# Patient Record
Sex: MALE | Race: BLACK OR AFRICAN AMERICAN | Employment: FULL TIME | URBAN - METROPOLITAN AREA
[De-identification: names, ages, dates, MRNs, and addresses within clinical notes are randomized per-mention and may not be internally consistent; named-entity substitution may affect disease eponyms.]

---

## 2017-11-13 ENCOUNTER — HOSPITAL ENCOUNTER (OUTPATIENT)
Dept: MRI IMAGING | Age: 37
Discharge: HOME | End: 2017-11-13
Attending: NURSE PRACTITIONER
Payer: COMMERCIAL

## 2017-11-13 DIAGNOSIS — M54.16 LUMBAR RADICULOPATHY: ICD-10-CM

## 2017-11-13 PROCEDURE — 72148 MRI LUMBAR SPINE W/O DYE: CPT

## 2019-08-06 ENCOUNTER — OFFICE VISIT (OUTPATIENT)
Dept: NEUROLOGY | Age: 39
End: 2019-08-06

## 2019-08-06 VITALS
HEIGHT: 69 IN | SYSTOLIC BLOOD PRESSURE: 132 MMHG | WEIGHT: 194 LBS | OXYGEN SATURATION: 99 % | DIASTOLIC BLOOD PRESSURE: 82 MMHG | HEART RATE: 64 BPM | BODY MASS INDEX: 28.73 KG/M2

## 2019-08-06 DIAGNOSIS — R53.83 OTHER FATIGUE: ICD-10-CM

## 2019-08-06 DIAGNOSIS — M54.41 ACUTE MIDLINE LOW BACK PAIN WITH RIGHT-SIDED SCIATICA: Primary | ICD-10-CM

## 2019-08-06 DIAGNOSIS — M54.2 NECK PAIN: ICD-10-CM

## 2019-08-06 RX ORDER — TIZANIDINE 4 MG/1
4 TABLET ORAL
Qty: 30 TAB | Refills: 5 | Status: SHIPPED | OUTPATIENT
Start: 2019-08-06 | End: 2022-11-01

## 2019-08-06 NOTE — PATIENT INSTRUCTIONS
A Healthy Lifestyle: Care Instructions  Your Care Instructions    A healthy lifestyle can help you feel good, stay at a healthy weight, and have plenty of energy for both work and play. A healthy lifestyle is something you can share with your whole family. A healthy lifestyle also can lower your risk for serious health problems, such as high blood pressure, heart disease, and diabetes. You can follow a few steps listed below to improve your health and the health of your family. Follow-up care is a key part of your treatment and safety. Be sure to make and go to all appointments, and call your doctor if you are having problems. It's also a good idea to know your test results and keep a list of the medicines you take. How can you care for yourself at home? · Do not eat too much sugar, fat, or fast foods. You can still have dessert and treats now and then. The goal is moderation. · Start small to improve your eating habits. Pay attention to portion sizes, drink less juice and soda pop, and eat more fruits and vegetables. ? Eat a healthy amount of food. A 3-ounce serving of meat, for example, is about the size of a deck of cards. Fill the rest of your plate with vegetables and whole grains. ? Limit the amount of soda and sports drinks you have every day. Drink more water when you are thirsty. ? Eat at least 5 servings of fruits and vegetables every day. It may seem like a lot, but it is not hard to reach this goal. A serving or helping is 1 piece of fruit, 1 cup of vegetables, or 2 cups of leafy, raw vegetables. Have an apple or some carrot sticks as an afternoon snack instead of a candy bar. Try to have fruits and/or vegetables at every meal.  · Make exercise part of your daily routine. You may want to start with simple activities, such as walking, bicycling, or slow swimming. Try to be active 30 to 60 minutes every day. You do not need to do all 30 to 60 minutes all at once.  For example, you can exercise 3 times a day for 10 or 20 minutes. Moderate exercise is safe for most people, but it is always a good idea to talk to your doctor before starting an exercise program.  · Keep moving. Yanira Koenig the lawn, work in the garden, or [employer]. Take the stairs instead of the elevator at work. · If you smoke, quit. People who smoke have an increased risk for heart attack, stroke, cancer, and other lung illnesses. Quitting is hard, but there are ways to boost your chance of quitting tobacco for good. ? Use nicotine gum, patches, or lozenges. ? Ask your doctor about stop-smoking programs and medicines. ? Keep trying. In addition to reducing your risk of diseases in the future, you will notice some benefits soon after you stop using tobacco. If you have shortness of breath or asthma symptoms, they will likely get better within a few weeks after you quit. · Limit how much alcohol you drink. Moderate amounts of alcohol (up to 2 drinks a day for men, 1 drink a day for women) are okay. But drinking too much can lead to liver problems, high blood pressure, and other health problems. Family health  If you have a family, there are many things you can do together to improve your health. · Eat meals together as a family as often as possible. · Eat healthy foods. This includes fruits, vegetables, lean meats and dairy, and whole grains. · Include your family in your fitness plan. Most people think of activities such as jogging or tennis as the way to fitness, but there are many ways you and your family can be more active. Anything that makes you breathe hard and gets your heart pumping is exercise. Here are some tips:  ? Walk to do errands or to take your child to school or the bus.  ? Go for a family bike ride after dinner instead of watching TV. Where can you learn more? Go to http://columba-jamel.info/. Enter X109 in the search box to learn more about \"A Healthy Lifestyle: Care Instructions. \"  Current as of: September 11, 2018  Content Version: 12.1  © 8311-4323 [employer], [employer]. Care instructions adapted under license by [employer] (which disclaims liability or warranty for this information). If you have questions about a medical condition or this instruction, always ask your healthcare professional. [address] any warranty or liability for your use of this information.

## 2019-08-06 NOTE — PROGRESS NOTES
NEUROLOGY HISTORY AND PHYSICAL    Name Willie Bateman Sr. Age 42 y.o. MRN [id number]  1980     Referring Physician:  Self referred     Chief Complaint:  Back pain     This is a 42 y.o.  right handed male with a medical history of anxiety. He comes with a complaint of pain in the lower back close to the tailbone. He was seen by the orthopedic 2 years ago who ordered an MRI of the lower spine and was ordered to do physical therapy which didn't help. He works in [employer]. Assessment and Plan  1. Back pain   Mri reviewed and discussed   tizaindine take as needed at night     2. Neck pain  Discussed pt and massage and using ice    3. fatigue  Consider sleep apnea  Will follow up if he needs to        Patient Allergies  Patient has no known allergies. Past Medical History:   Diagnosis Date    Anxiety disorder     Chest pain     Depression     Fatigue     Frequent headaches     Muscle pain     Muscle weakness     Snoring        Social History     Tobacco Use    Smoking status: Never Smoker    Smokeless tobacco: Never Used   Substance Use Topics    Alcohol use: Yes       Family History  Asthma  Grandfather MI      Exam  Visit Vitals  /82   Pulse 64   Ht 5' 9\" (1.753 m)   Wt 194 lb (88 kg)   SpO2 99%   BMI 28.65 kg/m²      General: Well developed, well nourished. Patient in no apparent distress   Head: Normocephalic, atraumatic, anicteric sclera   Neck Normal ROM, No thyromegally   Lungs:  Clear to auscultation bilaterally, No wheezes or rubs   Cardiac: Regular rate and rhythm with no murmurs. Abd: Bowel sounds were audible. No tenderness on palpation   Ext: No pedal edema   Skin: Supple no rash     NeurologicExam:  Mental Status: Alert and oriented to person place and time   Speech: Fluent no aphasia or dysarthria. Cranial Nerves:  II - XII Intact   Motor:  Full and symmetric strength of upper and lower proximal and distal muscles. Normal bulk and tone.     Reflexes: Deep tendon reflexes 2+/4 and symmetric. Sensory:   Symmetric and intact with no perceived deficits modalities involving small or large fibers. Gait:  Gait is balanced and fluid with normal arm swing. Tremor:   No tremor noted. Cerebellar:  Coordination intact. Neurovascular: No carotid bruits. No JVD       Imaging  MRI Results (most recent):  Results from Hospital Encounter encounter on 11/13/17   MRI LUMB SPINE WO CONT    Narrative EXAM:  MRI LUMB SPINE WO CONT  INDICATION:  Lumbar radiculopathy, constant lower lumbar pain and hip pain for 4  to 5 months right greater than left. TECHNIQUE: Sagittal T1, T2, STIR and axial T1 and T2 weighted images of the  lumbar spine were obtained. COMPARISON: None available. FINDINGS:  The distal spinal cord has normal contour and signal.  The lumbar vertebra are in good alignment. The lower two thoracic disks are unremarkable. L1-L2:  No disc bulge or stenosis. L2-L3:  No disc bulge or stenosis. L3-L4:  No disc bulge or stenosis. L4-L5:  No significant disk bulge or stenosis. L5-S1:  No significant disk bulge or stenosis. Impression IMPRESSION: Normal MRI lumbar spine.

## 2019-08-12 ENCOUNTER — TELEPHONE (OUTPATIENT)
Dept: NEUROLOGY | Age: 39
End: 2019-08-12

## 2019-08-12 NOTE — TELEPHONE ENCOUNTER
----- Message from Wong Garcia sent at 8/12/2019 12:44 PM EDT -----  Regarding: Dr. Fantasma Jackson Telephone  Patient would like a call back regarding getting a note for work.  Contact is [phone]

## 2019-08-13 NOTE — TELEPHONE ENCOUNTER
Patient stated it was recommended during his office visit to take some time off of work. Patient has decided to change his hours from 10 hours to 8 hours due to his back pain. Patient stated a letter with that information would help his schedule to change.     Please advise

## 2019-08-14 NOTE — TELEPHONE ENCOUNTER
Called and spoke with patient and advised the letter is ready,patient stated he will call tomorrow and provide a fax number.

## 2019-08-15 ENCOUNTER — TELEPHONE (OUTPATIENT)
Dept: NEUROLOGY | Age: 39
End: 2019-08-15

## 2019-08-15 NOTE — TELEPHONE ENCOUNTER
----- Message from Bakari Aviles sent at 8/15/2019  3:26 PM EDT -----  Regarding: Dr Lucia He first and last name:      Reason for call:status of fax      Callback required yes/no and why:      Best contact number(s):[phone]      Details to clarify the request: checking on status of fax sent around 11 am today 8/15/19      Bakari Aviles

## 2019-09-05 ENCOUNTER — TELEPHONE (OUTPATIENT)
Dept: NEUROLOGY | Age: 39
End: 2019-09-05

## 2019-09-05 NOTE — TELEPHONE ENCOUNTER
Needs an order for physical therapy sent to [facility] in [place].  Fax number [phone]

## 2019-09-06 ENCOUNTER — TELEPHONE (OUTPATIENT)
Dept: NEUROLOGY | Age: 39
End: 2019-09-06

## 2019-09-06 NOTE — TELEPHONE ENCOUNTER
----- Message from Sherryle Kinds sent at 9/6/2019  9:05 AM EDT -----  Regarding: Dr. Joaquin Alfaro first and last name: Vitor Metzger, .      Reason for call: Pt is advising nurse Marietta Cranker that physical therapy was recommended by Dr. Radha Keith for his condition.       Callback required yes/no and why:  Yes      Best contact number(s): [phone]      Details to clarify the request:      Sherryle Kinds

## 2019-09-09 ENCOUNTER — TELEPHONE (OUTPATIENT)
Dept: NEUROLOGY | Age: 39
End: 2019-09-09

## 2019-09-09 NOTE — TELEPHONE ENCOUNTER
----- Message from Nadira Gurrola sent at 9/9/2019 12:59 PM EDT -----  Regarding: Dr. Jesús Blount  Patient return call    Caller's first and last name and relationship (if not the patient):      Best contact number(s):   [phone]      Whose call is being returned:   Nurse's (name unknown) call received last Friday      Details to clarify the request:   Pt stated, he left a previous message requesting an order be sent to [facility] [phone]. Pt stated, he is currently undergoing physical therapy due back pain.       Nadira Gurrola

## 2019-09-18 NOTE — TELEPHONE ENCOUNTER
Spoke with patient, he knows that Dr. Harish Christopher is out and the request would have to wait for his return.

## 2019-09-24 ENCOUNTER — DOCUMENTATION ONLY (OUTPATIENT)
Dept: NEUROLOGY | Age: 39
End: 2019-09-24

## 2019-09-24 ENCOUNTER — TELEPHONE (OUTPATIENT)
Dept: NEUROLOGY | Age: 39
End: 2019-09-24

## 2019-09-24 NOTE — TELEPHONE ENCOUNTER
----- Message from Fred Pina sent at 9/24/2019 12:22 PM EDT -----  Regarding: Dr. Ana Choi first and last name: Rox Owenvidal      Reason for call: Pt following up on a faxed health care provider form to be sent pt's employer.       Callback required yes/no and why: yes      Best contact number(s): [phone]      Details to clarify the request:      Fred Pina

## 2019-09-24 NOTE — PROGRESS NOTES
Faxed work accommodation information to [employer], as well as mailed the patient a copy of what has been completed.

## 2019-09-24 NOTE — TELEPHONE ENCOUNTER
Spoke with the patient, advised the work accomodation paperwork has been faxed and a copy has been placed in the mail for him as well.

## 2022-11-01 ENCOUNTER — OFFICE VISIT (OUTPATIENT)
Dept: FAMILY MEDICINE CLINIC | Age: 42
End: 2022-11-01
Payer: COMMERCIAL

## 2022-11-01 VITALS
DIASTOLIC BLOOD PRESSURE: 82 MMHG | BODY MASS INDEX: 31.87 KG/M2 | SYSTOLIC BLOOD PRESSURE: 133 MMHG | RESPIRATION RATE: 16 BRPM | OXYGEN SATURATION: 94 % | TEMPERATURE: 97.9 F | WEIGHT: 215.2 LBS | HEIGHT: 69 IN | HEART RATE: 64 BPM

## 2022-11-01 DIAGNOSIS — Z11.59 PCR POSITIVE FOR HERPES SIMPLEX VIRUS TYPE 1 (HSV-1) DNA: ICD-10-CM

## 2022-11-01 DIAGNOSIS — R10.2 PERINEUM PAIN, MALE: ICD-10-CM

## 2022-11-01 DIAGNOSIS — R19.7 INTERMITTENT DIARRHEA: Primary | ICD-10-CM

## 2022-11-01 DIAGNOSIS — B00.9 PCR POSITIVE FOR HERPES SIMPLEX VIRUS TYPE 1 (HSV-1) DNA: ICD-10-CM

## 2022-11-01 DIAGNOSIS — Z13.220 LIPID SCREENING: ICD-10-CM

## 2022-11-01 PROCEDURE — 99204 OFFICE O/P NEW MOD 45 MIN: CPT | Performed by: STUDENT IN AN ORGANIZED HEALTH CARE EDUCATION/TRAINING PROGRAM

## 2022-11-01 NOTE — PROGRESS NOTES
Assessment/Plan:     Diagnoses and all orders for this visit:    1. Intermittent diarrhea  -     REFERRAL TO GASTROENTEROLOGY  -     CBC WITH AUTOMATED DIFF; Future  -     METABOLIC PANEL, COMPREHENSIVE; Future  -     SED RATE (ESR); Future  -     CRP, HIGH SENSITIVITY; Future  -     TSH 3RD GENERATION; Future  -Chronic intermittent diarrhea occurring for 2 to 3 days at a time and then will recur monthly  -Will check routine lab work for chronic diarrhea. -Given this has been persistent for years recommend GI referral for possible colonoscopy  -Patient can try loperamide as needed for acute episodes of diarrhea  -Return precautions discussed    2. Perineum pain, male  -Normal exam  -Symptoms only occur when having diarrhea therefore suspect it could be skin irritation due to his bowel movements  -Plan for treating diarrhea and find underlying etiology. See plan above    3. 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  -No current outbreak  -Return precautions discussed and can treat on an as-needed basis    4. Lipid screening  -     LIPID PANEL; Future      Follow-up and Dispositions    Return if symptoms worsen or fail to improve. Discussed expected course/resolution/complications of diagnosis in detail with patient. Medication risks/benefits/costs/interactions/alternatives discussed with patient. Pt expressed understanding with the diagnosis and plan    Subjective:      Joe Hill is a 38 y.o. male who presents for had concerns including Establish Care and Exposure to STD.        No Known Allergies  Past Medical History:   Diagnosis Date    Anxiety disorder     Chest pain     Depression     Fatigue     Frequent headaches     Muscle pain     Muscle weakness     Snoring      Past Surgical History:   Procedure Laterality Date    HX WISDOM TEETH EXTRACTION       Family History   Problem Relation Age of Onset    Asthma Mother     Asthma Brother     Asthma Maternal Grandmother     Heart Disease Maternal Grandfather      Social History     Socioeconomic History    Marital status: SINGLE     Spouse name: Not on file    Number of children: Not on file    Years of education: Not on file    Highest education level: Not on file   Occupational History    Not on file   Tobacco Use    Smoking status: Never    Smokeless tobacco: Never   Vaping Use    Vaping Use: Never used   Substance and Sexual Activity    Alcohol use: Yes     Comment: occ    Drug use: Never    Sexual activity: Yes   Other Topics Concern    Not on file   Social History Narrative    Not on file     Social Determinants of Health     Financial Resource Strain: Not on file   Food Insecurity: Not on file   Transportation Needs: Not on file   Physical Activity: Not on file   Stress: Not on file   Social Connections: Not on file   Intimate Partner Violence: Not on file   Housing Stability: Not on file       Patient is a 22-year-old male who presents the office today to establish care and for concerns of recent positive HSV-1 noted on lab work. Patient states he had recent lab work STD screening completed and was negative for hep A, B, C, HIV, syphilis, gonorrhea and chlamydia and HSV 2 however he was noted to be positive for IgG HSV-1. Patient has concerns of how he may have gotten this infection. He denies any active cold sores or genital lesion outbreak. Patient also states that he has been having intermittent episodes of diarrhea for several years now. He notes that about once per month he will get several days of persistent diarrhea. This will also cause a burning sensation in the perineum location. He has noted some bright red blood on the toilet paper in the past but none in the stool. He states he has currently had about a 2 to 3-day episode of diarrhea.   He states that his stools will normalize however this will recur. He denies any known food triggers. He denies any significant abdominal pain except some mild cramping when he is having a bowel movement. ROS:   Review of Systems   Constitutional:  Negative for chills and fever. HENT:  Negative for congestion. Respiratory:  Negative for cough and shortness of breath. Cardiovascular:  Negative for chest pain and leg swelling. Gastrointestinal:  Positive for diarrhea. Negative for abdominal pain, blood in stool, melena, nausea and vomiting. Genitourinary:  Negative for dysuria, frequency, hematuria and urgency. Neurological:  Negative for dizziness, tingling, weakness and headaches. Objective:   Visit Vitals  /82   Pulse 64   Temp 97.9 °F (36.6 °C) (Temporal)   Resp 16   Ht 5' 9\" (1.753 m)   Wt 215 lb 3.2 oz (97.6 kg)   SpO2 94%   BMI 31.78 kg/m²         Vitals and Nurse Documentation reviewed. Physical Exam  Exam conducted with a chaperone present. Constitutional:       General: He is not in acute distress. Appearance: Normal appearance. He is not toxic-appearing. HENT:      Head: Normocephalic and atraumatic. Eyes:      Conjunctiva/sclera: Conjunctivae normal.   Cardiovascular:      Rate and Rhythm: Normal rate and regular rhythm. Pulses: Normal pulses. Heart sounds: Normal heart sounds. No murmur heard. No gallop. Pulmonary:      Effort: Pulmonary effort is normal. No respiratory distress. Breath sounds: Normal breath sounds. No wheezing or rhonchi. Abdominal:      General: Bowel sounds are normal. There is no distension. Palpations: Abdomen is soft. Tenderness: There is no abdominal tenderness. There is no guarding or rebound. Genitourinary:     Penis: Normal. No erythema or lesions. Testes: Normal.      Rectum: No anal fissure or external hemorrhoid. Musculoskeletal:      Right lower leg: No edema. Left lower leg: No edema.    Neurological:      Mental Status: He is alert and oriented to person, place, and time.       Gait: Gait normal.

## 2022-11-01 NOTE — PROGRESS NOTES
Garret Elaine is a 38 y.o. male      Chief Complaint   Patient presents with    Establish Care    Exposure to STD         1. Have you been to the ER, urgent care clinic since your last visit? No Hospitalized since your last visit? 2. Have you seen or consulted any other health care providers outside of the [address] [place] [address] since your last visit? Include any pap smears or colon screening.    No

## 2023-01-27 ENCOUNTER — VIRTUAL VISIT (OUTPATIENT)
Dept: FAMILY MEDICINE CLINIC | Age: 43
End: 2023-01-27
Payer: COMMERCIAL

## 2023-01-27 ENCOUNTER — PATIENT MESSAGE (OUTPATIENT)
Dept: FAMILY MEDICINE CLINIC | Age: 43
End: 2023-01-27

## 2023-01-27 DIAGNOSIS — J06.9 VIRAL UPPER RESPIRATORY ILLNESS: Primary | ICD-10-CM

## 2023-01-27 PROCEDURE — 99213 OFFICE O/P EST LOW 20 MIN: CPT | Performed by: FAMILY MEDICINE

## 2023-01-27 NOTE — PROGRESS NOTES
Janna Manzanares is a 38 y.o. male who was seen by synchronous (real-time) audio-video technology on 1/27/2023 for Flu, Cough, and Sore Throat (/)    He has sore throat since Monday and got very bad 2 days ago but is feeling better today  Has a cough. No fever or chills  He works at [employer]. Decided he should get tested today to see if he can get meds  I explained he is out of the window for covid and influenza medication and since he is feeling better is likely not needed      Assessment & Plan:   Diagnoses and all orders for this visit:    1.  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 11/01/2022       ROS    Objective:     Patient-Reported Vitals 1/27/2023   Patient-Reported Weight 212lb        [INSTRUCTIONS:  \"[x]\" Indicates a positive item  \"[]\" Indicates a negative item  -- DELETE ALL ITEMS NOT EXAMINED]    Constitutional: [x] Appears well-developed and well-nourished [x] No apparent distress      [] Abnormal -     Mental status: [x] Alert and awake  [x] Oriented to person/place/time [x] Able to follow commands    [] Abnormal -     Eyes:   EOM    [x]  Normal    [] Abnormal -   Sclera  [x]  Normal    [] Abnormal -          Discharge [x]  None visible   [] Abnormal -     HENT: [x] Normocephalic, atraumatic  [] Abnormal -   [x] Mouth/Throat: Mucous membranes are moist    External Ears [x] Normal  [] Abnormal -    Neck: [x] No visualized mass [] Abnormal -     Pulmonary/Chest: [x] Respiratory effort normal   [x] No visualized signs of difficulty breathing or respiratory distress        [] Abnormal -      Musculoskeletal:   [x] Normal gait with no signs of ataxia         [x] Normal range of motion of neck        [] Abnormal -     Neurological:        [x] No Facial Asymmetry (Cranial nerve 7 motor function) (limited exam due to video visit)          [x] No gaze palsy        [] Abnormal -          Skin:        [x] No significant exanthematous lesions or discoloration noted on facial skin         [] Abnormal -            Psychiatric:       [x] Normal Affect [] Abnormal -        [x] No Hallucinations    Other pertinent observable physical exam findings:-        We discussed the expected course, resolution and complications of the diagnosis(es) in detail. Medication risks, benefits, costs, interactions, and alternatives were discussed as indicated. I advised him to contact the office if his condition worsens, changes or fails to improve as anticipated. He expressed understanding with the diagnosis(es) and plan. Tino BowerTanika, was evaluated through a synchronous (real-time) audio-video encounter. The patient (or guardian if applicable) is aware that this is a billable service, which includes applicable co-pays. This Virtual Visit was conducted with patient's (and/or legal guardian's) consent. The visit was conducted pursuant to the emergency declaration under the [address] [place] [address] authority and the [employer] General Act. Patient identification was verified, and a caregiver was present when appropriate.   The patient was located at: Home: [address]  The provider was located at: Home: Marielena Hernandez MD

## 2023-01-27 NOTE — LETTER
NOTIFICATION RETURN TO WORK / SCHOOL    1/27/2023 11:48 AM    Mr. Sherice Askew [phone]      To Whom It May Concern:    Gina Rivero. is currently under the care of [address]. He will return to work/school on: Tuesday January 31, 2023    If there are questions or concerns please have the patient contact our office.         Sincerely,      Erma Vogel MD

## 2023-01-27 NOTE — PROGRESS NOTES
1. Have you been to the ER, urgent care clinic since your last visit? Hospitalized since your last visit? No    2. Have you seen or consulted any other health care providers outside of the [address] [place] [address] since your last visit? Include any pap smears or colon screening. No    Chief Complaint   Patient presents with    Flu    Cough    Sore Throat            Patient-Reported Vitals 1/27/2023   Patient-Reported Weight 212lb      3 most recent PHQ Screens 1/27/2023   Little interest or pleasure in doing things Not at all   Feeling down, depressed, irritable, or hopeless Not at all   Total Score PHQ 2 0     Abuse Screening Questionnaire 1/27/2023   Do you ever feel afraid of your partner? N   Are you in a relationship with someone who physically or mentally threatens you? N   Is it safe for you to go home?  Y     Learning Assessment 8/6/2019   PRIMARY LEARNER Patient   PRIMARY LANGUAGE ENGLISH   LEARNER PREFERENCE PRIMARY DEMONSTRATION   ANSWERED BY patient   RELATIONSHIP SELF     Health Maintenance Due   Topic Date Due    Hepatitis C Screening  Never done    COVID-19 Vaccine (1) Never done    DTaP/Tdap/Td series (1 - Tdap) Never done    Flu Vaccine (1) Never done

## 2024-06-06 ENCOUNTER — HOSPITAL ENCOUNTER (EMERGENCY)
Facility: HOSPITAL | Age: 44
Discharge: HOME | End: 2024-06-06
Attending: STUDENT IN AN ORGANIZED HEALTH CARE EDUCATION/TRAINING PROGRAM
Payer: COMMERCIAL

## 2024-06-06 ENCOUNTER — APPOINTMENT (OUTPATIENT)
Facility: HOSPITAL | Age: 44
End: 2024-06-06
Payer: COMMERCIAL

## 2024-06-06 VITALS
HEIGHT: 69 IN | RESPIRATION RATE: 20 BRPM | TEMPERATURE: 98.4 F | WEIGHT: 210 LBS | BODY MASS INDEX: 31.1 KG/M2 | HEART RATE: 85 BPM | SYSTOLIC BLOOD PRESSURE: 126 MMHG | OXYGEN SATURATION: 96 % | DIASTOLIC BLOOD PRESSURE: 80 MMHG

## 2024-06-06 DIAGNOSIS — R42 DIZZINESS: Primary | ICD-10-CM

## 2024-06-06 DIAGNOSIS — S09.90XA INJURY OF HEAD, INITIAL ENCOUNTER: ICD-10-CM

## 2024-06-06 LAB
ALBUMIN SERPL-MCNC: 4.4 G/DL (ref 3.5–5)
ALBUMIN/GLOB SERPL: 1.2 (ref 1.1–2.2)
ALP SERPL-CCNC: 64 U/L (ref 45–117)
ALT SERPL-CCNC: 37 U/L (ref 12–78)
ANION GAP SERPL CALC-SCNC: 9 MMOL/L (ref 5–15)
AST SERPL-CCNC: 18 U/L (ref 15–37)
BASOPHILS # BLD: 0 K/UL (ref 0–0.1)
BASOPHILS NFR BLD: 0 % (ref 0–1)
BILIRUB SERPL-MCNC: 0.4 MG/DL (ref 0.2–1)
BUN/CREAT SERPL: 10 (ref 12–20)
CALCIUM SERPL-MCNC: 9 MG/DL (ref 8.5–10.1)
CO2 SERPL-SCNC: 31 MMOL/L (ref 21–32)
CREAT SERPL-MCNC: 1.29 MG/DL (ref 0.7–1.3)
DIFFERENTIAL METHOD BLD: ABNORMAL
EOSINOPHIL # BLD: 0.1 K/UL (ref 0–0.4)
EOSINOPHIL NFR BLD: 1 % (ref 0–7)
ERYTHROCYTE [DISTWIDTH] IN BLOOD BY AUTOMATED COUNT: 12.9 % (ref 11.5–14.5)
GLOBULIN SER CALC-MCNC: 3.7 G/DL (ref 2–4)
GLUCOSE SERPL-MCNC: 86 MG/DL (ref 65–100)
HCT VFR BLD AUTO: 42.2 % (ref 36.6–50.3)
HGB BLD-MCNC: 13.9 G/DL (ref 12.1–17)
IMM GRANULOCYTES # BLD AUTO: 0.1 K/UL (ref 0–0.04)
IMM GRANULOCYTES NFR BLD AUTO: 1 % (ref 0–0.5)
LYMPHOCYTES # BLD: 1.7 K/UL (ref 0.8–3.5)
LYMPHOCYTES NFR BLD: 24 % (ref 12–49)
MCH RBC QN AUTO: 26.1 PG (ref 26–34)
MCV RBC AUTO: 79.3 FL (ref 80–99)
MONOCYTES # BLD: 0.6 K/UL (ref 0–1)
MONOCYTES NFR BLD: 8 % (ref 5–13)
NEUTS SEG # BLD: 4.8 K/UL (ref 1.8–8)
NEUTS SEG NFR BLD: 66 % (ref 32–75)
NRBC # BLD: 0 K/UL (ref 0–0.01)
PLATELET # BLD AUTO: 220 K/UL (ref 150–400)
PMV BLD AUTO: 10.7 FL (ref 8.9–12.9)
POTASSIUM SERPL-SCNC: 3.4 MMOL/L (ref 3.5–5.1)
PROT SERPL-MCNC: 8.1 G/DL (ref 6.4–8.2)
RBC # BLD AUTO: 5.32 M/UL (ref 4.1–5.7)
SODIUM SERPL-SCNC: 141 MMOL/L (ref 136–145)
TROPONIN I SERPL HS-MCNC: 4 NG/L (ref 0–76)
WBC # BLD AUTO: 7.3 K/UL (ref 4.1–11.1)

## 2024-06-06 PROCEDURE — 2580000003 HC RX 258: Performed by: STUDENT IN AN ORGANIZED HEALTH CARE EDUCATION/TRAINING PROGRAM

## 2024-06-06 PROCEDURE — 93005 ELECTROCARDIOGRAM TRACING: CPT | Performed by: STUDENT IN AN ORGANIZED HEALTH CARE EDUCATION/TRAINING PROGRAM

## 2024-06-06 PROCEDURE — 96375 TX/PRO/DX INJ NEW DRUG ADDON: CPT

## 2024-06-06 PROCEDURE — 85025 COMPLETE CBC W/AUTO DIFF WBC: CPT

## 2024-06-06 PROCEDURE — 36415 COLL VENOUS BLD VENIPUNCTURE: CPT

## 2024-06-06 PROCEDURE — 70450 CT HEAD/BRAIN W/O DYE: CPT

## 2024-06-06 PROCEDURE — 99285 EMERGENCY DEPT VISIT HI MDM: CPT

## 2024-06-06 PROCEDURE — 84484 ASSAY OF TROPONIN QUANT: CPT

## 2024-06-06 PROCEDURE — 6360000002 HC RX W HCPCS: Performed by: STUDENT IN AN ORGANIZED HEALTH CARE EDUCATION/TRAINING PROGRAM

## 2024-06-06 PROCEDURE — 71046 X-RAY EXAM CHEST 2 VIEWS: CPT

## 2024-06-06 PROCEDURE — 96374 THER/PROPH/DIAG INJ IV PUSH: CPT

## 2024-06-06 PROCEDURE — 80053 COMPREHEN METABOLIC PANEL: CPT

## 2024-06-06 RX ORDER — KETOROLAC TROMETHAMINE 30 MG/ML
15 INJECTION, SOLUTION INTRAMUSCULAR; INTRAVENOUS
Status: COMPLETED | OUTPATIENT
Start: 2024-06-06 | End: 2024-06-06

## 2024-06-06 RX ORDER — 0.9 % SODIUM CHLORIDE 0.9 %
1000 INTRAVENOUS SOLUTION INTRAVENOUS ONCE
Status: COMPLETED | OUTPATIENT
Start: 2024-06-06 | End: 2024-06-06

## 2024-06-06 RX ORDER — ONDANSETRON 2 MG/ML
4 INJECTION INTRAMUSCULAR; INTRAVENOUS ONCE
Status: COMPLETED | OUTPATIENT
Start: 2024-06-06 | End: 2024-06-06

## 2024-06-06 RX ADMIN — ONDANSETRON 4 MG: 2 INJECTION INTRAMUSCULAR; INTRAVENOUS at 16:40

## 2024-06-06 RX ADMIN — SODIUM CHLORIDE 1000 ML: 9 INJECTION, SOLUTION INTRAVENOUS at 16:40

## 2024-06-06 RX ADMIN — KETOROLAC TROMETHAMINE 15 MG: 30 INJECTION, SOLUTION INTRAMUSCULAR at 16:41

## 2024-06-06 ASSESSMENT — LIFESTYLE VARIABLES
HOW MANY STANDARD DRINKS CONTAINING ALCOHOL DO YOU HAVE ON A TYPICAL DAY: PATIENT DOES NOT DRINK
HOW OFTEN DO YOU HAVE A DRINK CONTAINING ALCOHOL: NEVER

## 2024-06-06 ASSESSMENT — PAIN - FUNCTIONAL ASSESSMENT: PAIN_FUNCTIONAL_ASSESSMENT: 0-10

## 2024-06-06 ASSESSMENT — PAIN DESCRIPTION - LOCATION
LOCATION: HEAD
LOCATION: HEAD

## 2024-06-06 ASSESSMENT — PAIN SCALES - GENERAL
PAINLEVEL_OUTOF10: 5
PAINLEVEL_OUTOF10: 5

## 2024-06-06 ASSESSMENT — ENCOUNTER SYMPTOMS
ABDOMINAL PAIN: 0
NAUSEA: 1
SHORTNESS OF BREATH: 0

## 2024-06-06 ASSESSMENT — PAIN DESCRIPTION - DESCRIPTORS: DESCRIPTORS: ACHING

## 2024-06-06 NOTE — ED PROVIDER NOTES
[facility] EMERGENCY DEPT  EMERGENCY DEPARTMENT ENCOUNTER      Pt Name: Tacos Raymond Sr.  MRN: [id number]  Birthdate 1980  Date of evaluation: 6/6/2024  Provider: Andrea Baez DO    CHIEF COMPLAINT       Chief Complaint   Patient presents with    Dizziness         HISTORY OF PRESENT ILLNESS   (Location/Symptom, Timing/Onset, Context/Setting, Quality, Duration, Modifying Factors, Severity)  Note limiting factors.   This is a 44-year-old male who presents ED for evaluation of headache and lightheadedness.  Patient reports that he was trying to get in a car about 3 hours ago struck his head on the anterior aspect on the metal frame.  Did not lose conscious reports having some dizziness and tingling sensation in his fingers and feeling weird.  Had some nausea without vomiting.  He is not on anticoagulation.  Reports that he always has chest discomfort no history of heart attack, no shortness of breath no abdominal pain.            Review of External Medical Records:     Nursing Notes were reviewed.    REVIEW OF SYSTEMS    (2-9 systems for level 4, 10 or more for level 5)     Review of Systems   Respiratory:  Negative for shortness of breath.    Cardiovascular:  Positive for chest pain.   Gastrointestinal:  Positive for nausea. Negative for abdominal pain.   Neurological:  Positive for facial asymmetry and light-headedness.       Except as noted above the remainder of the review of systems was reviewed and negative.       PAST MEDICAL HISTORY     Past Medical History:   Diagnosis Date    Anxiety disorder     Chest pain     Depression     Fatigue     Frequent headaches     Muscle pain     Muscle weakness     Snoring          SURGICAL HISTORY       Past Surgical History:   Procedure Laterality Date    WISDOM TOOTH EXTRACTION           CURRENT MEDICATIONS       Previous Medications    No medications on file       ALLERGIES     Patient has no known allergies.    FAMILY HISTORY       Family History   Problem

## 2024-06-06 NOTE — ED TRIAGE NOTES
Patient ambulatory to room 7 escorted by EMS for concern of dizziness. Patient states he was waiting for a prescription in [facility] when he felt dizzy, bilateral tingling in fingers, right arm \"feeling weird\".   Patient states he has a hx of dizziness. Patient states he bump his forehead on the car door as he was getting into his car about 3 hours prior to the dizzy spell.

## 2024-06-06 NOTE — DISCHARGE INSTRUCTIONS
The patient has been re-evaluated and are stable for discharge.  All available radiology and laboratory results have been reviewed with patient and/or available family.  Patient and/or family verbally conveyed their understanding and agreement of the patient's signs, symptoms, diagnosis, treatment and prognosis and additionally agree to follow-up as recommended in the discharge instructions or to return to the Emergency Department should their condition change or worsen prior to their follow-up appointment.  All questions have been answered and patient and/or available family who express understanding.  Directions

## 2024-06-07 LAB
EKG ATRIAL RATE: 86 BPM
EKG DIAGNOSIS: NORMAL
EKG P AXIS: 48 DEGREES
EKG P-R INTERVAL: 178 MS
EKG QRS DURATION: 88 MS
EKG QTC CALCULATION (BAZETT): 423 MS
EKG R AXIS: 76 DEGREES
EKG T AXIS: 29 DEGREES

## 2024-06-07 PROCEDURE — 93010 ELECTROCARDIOGRAM REPORT: CPT | Performed by: SPECIALIST

## 2025-05-03 ENCOUNTER — APPOINTMENT (OUTPATIENT)
Facility: HOSPITAL | Age: 45
DRG: 552 | End: 2025-05-03
Payer: COMMERCIAL

## 2025-05-03 ENCOUNTER — HOSPITAL ENCOUNTER (INPATIENT)
Facility: HOSPITAL | Age: 45
LOS: 2 days | Discharge: HOME | DRG: 552 | End: 2025-05-05
Attending: STUDENT IN AN ORGANIZED HEALTH CARE EDUCATION/TRAINING PROGRAM | Admitting: STUDENT IN AN ORGANIZED HEALTH CARE EDUCATION/TRAINING PROGRAM
Payer: COMMERCIAL

## 2025-05-03 DIAGNOSIS — G44.209 TENSION HEADACHE: ICD-10-CM

## 2025-05-03 DIAGNOSIS — R20.2 PARESTHESIA: Primary | ICD-10-CM

## 2025-05-03 PROBLEM — R29.818 NEUROLOGIC ABNORMALITY: Status: ACTIVE | Noted: 2025-05-03

## 2025-05-03 LAB
ALBUMIN SERPL-MCNC: 4 G/DL (ref 3.5–5)
ALBUMIN/GLOB SERPL: 1.1 (ref 1.1–2.2)
ALP SERPL-CCNC: 57 U/L (ref 45–117)
ALT SERPL-CCNC: 30 U/L (ref 12–78)
AMPHET UR QL SCN: NEGATIVE
ANION GAP SERPL CALC-SCNC: 10 MMOL/L (ref 2–12)
APPEARANCE UR: CLEAR
AST SERPL-CCNC: 18 U/L (ref 15–37)
BACTERIA URNS QL MICRO: NEGATIVE /HPF
BARBITURATES UR QL SCN: NEGATIVE
BASOPHILS # BLD: 0.03 K/UL (ref 0–0.1)
BASOPHILS NFR BLD: 0.5 % (ref 0–1)
BENZODIAZ UR QL: NEGATIVE
BILIRUB SERPL-MCNC: 0.4 MG/DL (ref 0.2–1)
BILIRUB UR QL: NEGATIVE
BUN SERPL-MCNC: 8 MG/DL (ref 6–20)
BUN/CREAT SERPL: 7 (ref 12–20)
CALCIUM SERPL-MCNC: 9.1 MG/DL (ref 8.5–10.1)
CANNABINOIDS UR QL SCN: NEGATIVE
CHLORIDE SERPL-SCNC: 105 MMOL/L (ref 97–108)
CHOLEST SERPL-MCNC: 126 MG/DL
CO2 SERPL-SCNC: 24 MMOL/L (ref 21–32)
COCAINE UR QL SCN: NEGATIVE
COLOR UR: NORMAL
CREAT SERPL-MCNC: 1.16 MG/DL (ref 0.7–1.3)
DIFFERENTIAL METHOD BLD: ABNORMAL
EOSINOPHIL # BLD: 0.04 K/UL (ref 0–0.4)
EOSINOPHIL NFR BLD: 0.6 % (ref 0–7)
EPITH CASTS URNS QL MICRO: NORMAL /LPF
ERYTHROCYTE [DISTWIDTH] IN BLOOD BY AUTOMATED COUNT: 12.9 % (ref 11.5–14.5)
EST. AVERAGE GLUCOSE BLD GHB EST-MCNC: 126 MG/DL
ETHANOL SERPL-MCNC: <10 MG/DL (ref 0–0.08)
GLOBULIN SER CALC-MCNC: 3.5 G/DL (ref 2–4)
GLUCOSE BLD STRIP.AUTO-MCNC: 97 MG/DL (ref 65–117)
GLUCOSE SERPL-MCNC: 97 MG/DL (ref 65–100)
GLUCOSE UR STRIP.AUTO-MCNC: NEGATIVE MG/DL
HBA1C MFR BLD: 6 % (ref 4–5.6)
HCT VFR BLD AUTO: 39.2 % (ref 36.6–50.3)
HDLC SERPL-MCNC: 32 MG/DL
HDLC SERPL: 3.9 (ref 0–5)
HGB BLD-MCNC: 12.6 G/DL (ref 12.1–17)
HGB UR QL STRIP: NEGATIVE
HYALINE CASTS URNS QL MICRO: NORMAL /LPF (ref 0–2)
IMM GRANULOCYTES # BLD AUTO: 0.03 K/UL (ref 0–0.04)
IMM GRANULOCYTES NFR BLD AUTO: 0.5 % (ref 0–0.5)
INR PPP: 1 (ref 0.9–1.1)
KETONES UR QL STRIP.AUTO: NEGATIVE MG/DL
LDLC SERPL CALC-MCNC: 71.4 MG/DL (ref 0–100)
LEUKOCYTE ESTERASE UR QL STRIP.AUTO: NEGATIVE
LIPASE SERPL-CCNC: 31 U/L (ref 13–75)
LYMPHOCYTES # BLD: 1.57 K/UL (ref 0.8–3.5)
LYMPHOCYTES NFR BLD: 24 % (ref 12–49)
Lab: NORMAL
MAGNESIUM SERPL-MCNC: 1.9 MG/DL (ref 1.6–2.4)
MAGNESIUM SERPL-MCNC: 2.2 MG/DL (ref 1.6–2.4)
MCH RBC QN AUTO: 25.9 PG (ref 26–34)
MCHC RBC AUTO-ENTMCNC: 32.1 G/DL (ref 30–36.5)
MCV RBC AUTO: 80.7 FL (ref 80–99)
METHADONE UR QL: NEGATIVE
MONOCYTES # BLD: 0.59 K/UL (ref 0–1)
MONOCYTES NFR BLD: 9 % (ref 5–13)
NEUTS SEG # BLD: 4.29 K/UL (ref 1.8–8)
NEUTS SEG NFR BLD: 65.4 % (ref 32–75)
NITRITE UR QL STRIP.AUTO: NEGATIVE
NRBC # BLD: 0 K/UL (ref 0–0.01)
NRBC BLD-RTO: 0 PER 100 WBC
OPIATES UR QL: NEGATIVE
PCP UR QL: NEGATIVE
PH UR STRIP: 6.5 (ref 5–8)
PHOSPHATE SERPL-MCNC: 2.7 MG/DL (ref 2.6–4.7)
PLATELET # BLD AUTO: 223 K/UL (ref 150–400)
PMV BLD AUTO: 10.5 FL (ref 8.9–12.9)
POTASSIUM SERPL-SCNC: 3.3 MMOL/L (ref 3.5–5.1)
PROT SERPL-MCNC: 7.5 G/DL (ref 6.4–8.2)
PROT UR STRIP-MCNC: NEGATIVE MG/DL
PROTHROMBIN TIME: 11.1 SEC (ref 9.2–11.2)
RBC # BLD AUTO: 4.86 M/UL (ref 4.1–5.7)
RBC #/AREA URNS HPF: NORMAL /HPF (ref 0–5)
SERVICE CMNT-IMP: NORMAL
SODIUM SERPL-SCNC: 139 MMOL/L (ref 136–145)
SP GR UR REFRACTOMETRY: <1.005
TRIGL SERPL-MCNC: 113 MG/DL
TROPONIN I SERPL HS-MCNC: 5 NG/L (ref 0–76)
URINE CULTURE IF INDICATED: NORMAL
UROBILINOGEN UR QL STRIP.AUTO: 0.2 EU/DL (ref 0.2–1)
VLDLC SERPL CALC-MCNC: 22.6 MG/DL
WBC # BLD AUTO: 6.6 K/UL (ref 4.1–11.1)
WBC URNS QL MICRO: NORMAL /HPF (ref 0–4)

## 2025-05-03 PROCEDURE — 84100 ASSAY OF PHOSPHORUS: CPT

## 2025-05-03 PROCEDURE — 80061 LIPID PANEL: CPT

## 2025-05-03 PROCEDURE — 6370000000 HC RX 637 (ALT 250 FOR IP): Performed by: STUDENT IN AN ORGANIZED HEALTH CARE EDUCATION/TRAINING PROGRAM

## 2025-05-03 PROCEDURE — 99285 EMERGENCY DEPT VISIT HI MDM: CPT

## 2025-05-03 PROCEDURE — 6360000004 HC RX CONTRAST MEDICATION: Performed by: STUDENT IN AN ORGANIZED HEALTH CARE EDUCATION/TRAINING PROGRAM

## 2025-05-03 PROCEDURE — 1100000003 HC PRIVATE W/ TELEMETRY

## 2025-05-03 PROCEDURE — 96374 THER/PROPH/DIAG INJ IV PUSH: CPT

## 2025-05-03 PROCEDURE — 82077 ASSAY SPEC XCP UR&BREATH IA: CPT

## 2025-05-03 PROCEDURE — 80053 COMPREHEN METABOLIC PANEL: CPT

## 2025-05-03 PROCEDURE — 70496 CT ANGIOGRAPHY HEAD: CPT

## 2025-05-03 PROCEDURE — 84484 ASSAY OF TROPONIN QUANT: CPT

## 2025-05-03 PROCEDURE — 80307 DRUG TEST PRSMV CHEM ANLYZR: CPT

## 2025-05-03 PROCEDURE — 86037 ANCA TITER EACH ANTIBODY: CPT

## 2025-05-03 PROCEDURE — 81001 URINALYSIS AUTO W/SCOPE: CPT

## 2025-05-03 PROCEDURE — 2580000003 HC RX 258: Performed by: STUDENT IN AN ORGANIZED HEALTH CARE EDUCATION/TRAINING PROGRAM

## 2025-05-03 PROCEDURE — 6360000002 HC RX W HCPCS: Performed by: STUDENT IN AN ORGANIZED HEALTH CARE EDUCATION/TRAINING PROGRAM

## 2025-05-03 PROCEDURE — 83690 ASSAY OF LIPASE: CPT

## 2025-05-03 PROCEDURE — 70551 MRI BRAIN STEM W/O DYE: CPT

## 2025-05-03 PROCEDURE — 83036 HEMOGLOBIN GLYCOSYLATED A1C: CPT

## 2025-05-03 PROCEDURE — 85610 PROTHROMBIN TIME: CPT

## 2025-05-03 PROCEDURE — 85025 COMPLETE CBC W/AUTO DIFF WBC: CPT

## 2025-05-03 PROCEDURE — 82962 GLUCOSE BLOOD TEST: CPT

## 2025-05-03 PROCEDURE — 2500000003 HC RX 250 WO HCPCS: Performed by: STUDENT IN AN ORGANIZED HEALTH CARE EDUCATION/TRAINING PROGRAM

## 2025-05-03 PROCEDURE — 72141 MRI NECK SPINE W/O DYE: CPT

## 2025-05-03 PROCEDURE — 70450 CT HEAD/BRAIN W/O DYE: CPT

## 2025-05-03 PROCEDURE — 83516 IMMUNOASSAY NONANTIBODY: CPT

## 2025-05-03 PROCEDURE — 4A03X5D MEASUREMENT OF ARTERIAL FLOW, INTRACRANIAL, EXTERNAL APPROACH: ICD-10-PCS | Performed by: STUDENT IN AN ORGANIZED HEALTH CARE EDUCATION/TRAINING PROGRAM

## 2025-05-03 PROCEDURE — 36415 COLL VENOUS BLD VENIPUNCTURE: CPT

## 2025-05-03 PROCEDURE — 83735 ASSAY OF MAGNESIUM: CPT

## 2025-05-03 PROCEDURE — 96375 TX/PRO/DX INJ NEW DRUG ADDON: CPT

## 2025-05-03 RX ORDER — LIDOCAINE 4 G/G
1 PATCH TOPICAL
Status: DISCONTINUED | OUTPATIENT
Start: 2025-05-03 | End: 2025-05-03

## 2025-05-03 RX ORDER — ACETAMINOPHEN 325 MG/1
650 TABLET ORAL EVERY 6 HOURS PRN
Status: DISCONTINUED | OUTPATIENT
Start: 2025-05-03 | End: 2025-05-05 | Stop reason: HOSPADM

## 2025-05-03 RX ORDER — PREGABALIN 75 MG/1
75 CAPSULE ORAL 3 TIMES DAILY PRN
Status: ON HOLD | COMMUNITY
End: 2025-05-05 | Stop reason: HOSPADM

## 2025-05-03 RX ORDER — ASPIRIN 81 MG/1
81 TABLET, CHEWABLE ORAL DAILY
Status: DISCONTINUED | OUTPATIENT
Start: 2025-05-04 | End: 2025-05-05 | Stop reason: HOSPADM

## 2025-05-03 RX ORDER — DIAZEPAM 10 MG/2ML
5 INJECTION, SOLUTION INTRAMUSCULAR; INTRAVENOUS ONCE
Status: COMPLETED | OUTPATIENT
Start: 2025-05-03 | End: 2025-05-03

## 2025-05-03 RX ORDER — ASPIRIN 300 MG/1
300 SUPPOSITORY RECTAL DAILY
Status: DISCONTINUED | OUTPATIENT
Start: 2025-05-04 | End: 2025-05-03

## 2025-05-03 RX ORDER — KETOROLAC TROMETHAMINE 30 MG/ML
15 INJECTION, SOLUTION INTRAMUSCULAR; INTRAVENOUS ONCE
Status: COMPLETED | OUTPATIENT
Start: 2025-05-03 | End: 2025-05-03

## 2025-05-03 RX ORDER — POTASSIUM CHLORIDE 1.5 G/1.58G
40 POWDER, FOR SOLUTION ORAL ONCE
Status: COMPLETED | OUTPATIENT
Start: 2025-05-03 | End: 2025-05-03

## 2025-05-03 RX ORDER — IOPAMIDOL 755 MG/ML
100 INJECTION, SOLUTION INTRAVASCULAR
Status: COMPLETED | OUTPATIENT
Start: 2025-05-03 | End: 2025-05-03

## 2025-05-03 RX ORDER — ONDANSETRON 2 MG/ML
4 INJECTION INTRAMUSCULAR; INTRAVENOUS EVERY 6 HOURS PRN
Status: DISCONTINUED | OUTPATIENT
Start: 2025-05-03 | End: 2025-05-05 | Stop reason: HOSPADM

## 2025-05-03 RX ORDER — ENOXAPARIN SODIUM 100 MG/ML
40 INJECTION SUBCUTANEOUS DAILY
Status: DISCONTINUED | OUTPATIENT
Start: 2025-05-03 | End: 2025-05-05 | Stop reason: HOSPADM

## 2025-05-03 RX ORDER — POLYETHYLENE GLYCOL 3350 17 G/17G
17 POWDER, FOR SOLUTION ORAL DAILY PRN
Status: DISCONTINUED | OUTPATIENT
Start: 2025-05-03 | End: 2025-05-05 | Stop reason: HOSPADM

## 2025-05-03 RX ORDER — SODIUM CHLORIDE 9 MG/ML
INJECTION, SOLUTION INTRAVENOUS PRN
Status: DISCONTINUED | OUTPATIENT
Start: 2025-05-03 | End: 2025-05-05 | Stop reason: HOSPADM

## 2025-05-03 RX ORDER — ONDANSETRON 4 MG/1
4 TABLET, ORALLY DISINTEGRATING ORAL EVERY 8 HOURS PRN
Status: DISCONTINUED | OUTPATIENT
Start: 2025-05-03 | End: 2025-05-05 | Stop reason: HOSPADM

## 2025-05-03 RX ORDER — LIDOCAINE 4 G/G
1 PATCH TOPICAL EVERY 12 HOURS
Status: DISCONTINUED | OUTPATIENT
Start: 2025-05-03 | End: 2025-05-05 | Stop reason: HOSPADM

## 2025-05-03 RX ORDER — 0.9 % SODIUM CHLORIDE 0.9 %
1000 INTRAVENOUS SOLUTION INTRAVENOUS ONCE
Status: COMPLETED | OUTPATIENT
Start: 2025-05-03 | End: 2025-05-03

## 2025-05-03 RX ORDER — SODIUM CHLORIDE 0.9 % (FLUSH) 0.9 %
5-40 SYRINGE (ML) INJECTION EVERY 12 HOURS SCHEDULED
Status: DISCONTINUED | OUTPATIENT
Start: 2025-05-03 | End: 2025-05-05 | Stop reason: HOSPADM

## 2025-05-03 RX ORDER — SODIUM CHLORIDE 0.9 % (FLUSH) 0.9 %
5-40 SYRINGE (ML) INJECTION PRN
Status: DISCONTINUED | OUTPATIENT
Start: 2025-05-03 | End: 2025-05-05 | Stop reason: HOSPADM

## 2025-05-03 RX ORDER — ROSUVASTATIN CALCIUM 40 MG/1
40 TABLET, COATED ORAL NIGHTLY
Status: DISCONTINUED | OUTPATIENT
Start: 2025-05-03 | End: 2025-05-05 | Stop reason: HOSPADM

## 2025-05-03 RX ORDER — KETOROLAC TROMETHAMINE 30 MG/ML
30 INJECTION, SOLUTION INTRAMUSCULAR; INTRAVENOUS EVERY 6 HOURS PRN
Status: DISPENSED | OUTPATIENT
Start: 2025-05-03 | End: 2025-05-03

## 2025-05-03 RX ADMIN — SODIUM CHLORIDE, PRESERVATIVE FREE 10 ML: 5 INJECTION INTRAVENOUS at 20:40

## 2025-05-03 RX ADMIN — IOPAMIDOL 100 ML: 755 INJECTION, SOLUTION INTRAVENOUS at 05:54

## 2025-05-03 RX ADMIN — SODIUM CHLORIDE 1000 ML: 0.9 INJECTION, SOLUTION INTRAVENOUS at 05:25

## 2025-05-03 RX ADMIN — DICLOFENAC SODIUM 4 G: 10 GEL TOPICAL at 20:40

## 2025-05-03 RX ADMIN — KETOROLAC TROMETHAMINE 15 MG: 30 INJECTION, SOLUTION INTRAMUSCULAR at 05:28

## 2025-05-03 RX ADMIN — ROSUVASTATIN CALCIUM 40 MG: 40 TABLET, FILM COATED ORAL at 20:40

## 2025-05-03 RX ADMIN — KETOROLAC TROMETHAMINE 30 MG: 30 INJECTION, SOLUTION INTRAMUSCULAR at 18:46

## 2025-05-03 RX ADMIN — SODIUM CHLORIDE 1000 ML: 0.9 INJECTION, SOLUTION INTRAVENOUS at 14:20

## 2025-05-03 RX ADMIN — DIAZEPAM 5 MG: 5 INJECTION, SOLUTION INTRAMUSCULAR; INTRAVENOUS at 05:27

## 2025-05-03 RX ADMIN — POTASSIUM CHLORIDE 40 MEQ: 1.5 FOR SOLUTION ORAL at 14:17

## 2025-05-03 RX ADMIN — ENOXAPARIN SODIUM 40 MG: 100 INJECTION SUBCUTANEOUS at 14:13

## 2025-05-03 ASSESSMENT — PAIN DESCRIPTION - ORIENTATION
ORIENTATION: RIGHT

## 2025-05-03 ASSESSMENT — LIFESTYLE VARIABLES
HOW OFTEN DO YOU HAVE A DRINK CONTAINING ALCOHOL: MONTHLY OR LESS
HOW MANY STANDARD DRINKS CONTAINING ALCOHOL DO YOU HAVE ON A TYPICAL DAY: 1 OR 2

## 2025-05-03 ASSESSMENT — PAIN SCALES - GENERAL
PAINLEVEL_OUTOF10: 7
PAINLEVEL_OUTOF10: 5
PAINLEVEL_OUTOF10: 7
PAINLEVEL_OUTOF10: 7
PAINLEVEL_OUTOF10: 8
PAINLEVEL_OUTOF10: 8

## 2025-05-03 ASSESSMENT — PAIN DESCRIPTION - DESCRIPTORS
DESCRIPTORS: ACHING
DESCRIPTORS: CRAMPING;NUMBNESS
DESCRIPTORS: ACHING
DESCRIPTORS: ACHING

## 2025-05-03 ASSESSMENT — PAIN DESCRIPTION - LOCATION
LOCATION: ARM;LEG
LOCATION: KNEE
LOCATION: KNEE;NECK
LOCATION: SHOULDER;NECK

## 2025-05-03 ASSESSMENT — PAIN DESCRIPTION - PAIN TYPE: TYPE: ACUTE PAIN

## 2025-05-03 NOTE — PROGRESS NOTES
Agree with radiologist recommendation for contrast mri  Other diagnostic tests might include bone scan

## 2025-05-03 NOTE — ED NOTES
Pt back from MRI. Placed on the monitor x3, x2 SR and call bell in lap. No further requests from pt at this time.

## 2025-05-03 NOTE — PROGRESS NOTES
End of Shift Note    Bedside shift change report given to  KAREN Kay  (oncoming nurse) by Yudi Mitchell RN .        Shift worked:  7a-7p   Shift summary and any significant changes:     New admission, database complete. No acute changes. Patient still complaining of pain on the right side, particularly in the neck/shoulder area and the knee. MD added new orders for pain relief.       Concerns for physician to address:  See above   Zone phone for oncoming shift:   [phone]     Patient Information  Tacos Raymond Sr.  44 y.o.  5/3/2025  4:36 AM by Kun Wyatt MD. Tacos Raymond Sr. 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 11/01/2022     Past Medical History:   Diagnosis Date    Anxiety disorder     Chest pain     Depression     Fatigue     Frequent headaches     Muscle pain     Muscle weakness     Snoring        Core Measures:  CVA: yes  CHF: no  PNA: no    Activity:   Number times ambulated in hallways past shift: 0  Number of times OOB to chair past shift: 0    Cardiac:   Cardiac Monitoring: yes, SR    Access:   Current line(s): PIV    Respiratory:   O2 Device: None (Room air)    GI:     Current diet:  ADULT DIET; Regular  Tolerating current diet: Yes    Pain Management:   Patient states pain is manageable on current regimen: yes    Skin:  Natalio Scale Score: 21  Interventions: N/A  Pressure injury: no    Patient Safety:  Fall Score: Ernandez Total Score: 30  Interventions: bed alarm  Self-release roll belt: No  Dexterity to release roll belt: yes   (must document dexterity  here by stating Yes or No here, otherwise this is a restraint and must follow restraint documentation policy.)    DVT prophylaxis:  DVT prophylaxis: meds    Active Consults:  IP CONSULT TO CASE MANAGEMENT  IP CONSULT TO NEUROLOGY  IP CONSULT TO NEUROSURGERY    Length of Stay:  Expected LOS: 3  Actual

## 2025-05-03 NOTE — H&P
Hospitalist Admission Note    NAME:   Tacos Raymond SrAlondra   : 1980   MRN: [id number]     Date/Time: 5/3/2025 12:19 PM    Patient PCP: Mervat Lang MD    ______________________________________________________________________  Given the patient's current clinical presentation, I have a high level of concern for decompensation if discharged from the emergency department.  Complex decision making was performed, which includes reviewing the patient's available past medical records, laboratory results, and x-ray films.       My assessment of this patient's clinical condition and my plan of care is as follows.    Assessment / Plan:      TIA, CVA evaluation  MS evaluation  Right skull hyperintense lesion POA  B/L upper & lower extremity paresthesia  Chronic neck pain  Degenerative cervical disc disease, POA  Degenerative IV lumbar disc disease, POA  -Right facial, right arm and leg numbness, neck and bilateral temporal pain  -CT head and neck negative for intracranial process, vessel occlusion or dissection  - MRI cervical and brain shows 14 x 9 x 8 T2 hyperintense lesion at the right skull base  - CT head negative for intracranial process  - Normal alcohol level, UA and U tox negative  - Troponin WNL  - CMP remarkable for hypokalemia  - CBC negative for leukocytosis  -Outpatient workup  labs were normal that include Lyme's, CK, INES, dsDNA, SCL 70 autoantibodies, Smith autoantibodies, SM/RNP, SS-A/Brittney, zinc level, ceruloplasmin,  - Neurology consult for further evaluation of the patient's symptoms and right skull lesion  - PT OT  -Pain control with Tylenol, lidocaine patch, Voltaren gel neck pain, continue home medication Lyrica    CKD stage II  Hypokalemia  - Baseline creatinine, K3.3 POA  - Monitor electrolytes and replace as needed    Anxiety  Depression  Scoliosis  -Resume home meds as ordered        Medical Decision Making:   I personally reviewed labs: U tox, UA, ethanol, tropes, CMP, lipase,

## 2025-05-03 NOTE — ED NOTES
Bedside and Verbal shift change report given to Mine (oncoming nurse) by Jamilah (offgoing nurse). Report included the following information Nurse Handoff Report, Index, ED Encounter Summary, ED SBAR, Adult Overview, MAR, and Recent Results.

## 2025-05-03 NOTE — ED PROVIDER NOTES
[facility] EMERGENCY DEPARTMENT  EMERGENCY DEPARTMENT ENCOUNTER       Pt Name: Tacos Raymond Sr.  MRN: [id number]  Birthdate 1980  Date of evaluation: 5/3/2025  Provider: Jan Good MD   PCP: Mervat Lang MD  Note Started: 6:56 AM EDT 5/3/25     CHIEF COMPLAINT       Chief Complaint   Patient presents with    Numbness     Wheeled into triage for right arm numbness that radiates to right leg starting 1 hour prior to arrival. Reports he saw a neurologist Monday and has MRI scheduled soon. Reports hard time concentrating         HISTORY OF PRESENT ILLNESS: 1 or more elements      History From: Patient  HPI Limitations: None     Tacos Raymond Sr. is a 44 y.o. male who presents with numbness that radiates from his right neck all the way down to his right leg.  He reports has been having numbness to his right arm, right leg for the past few weeks.  He states over the past hour he has had worsening symptoms with pain in his right neck/upper back and right upper extremity.  He reports intermittent headache.  Reports he currently has a headache.  No falls or injuries.  Denies alcohol use.  He denies saddle paresthesias.  Denies IV drug use.  Denies fever, chills, rash, chest pain, shortness of breath, diarrhea.  He also reports he was seen recently for the symptoms and had imaging at an outside hospital and was referred to outpatient neurology and outpatient MRI for further workup.  He states he was recently prescribed gabapentin for suspected neuropathy and to help treat the symptoms.      Nursing Notes were all reviewed and agreed with or any disagreements were addressed in the HPI.     REVIEW OF SYSTEMS      Review of Systems     Positives and Pertinent negatives as per HPI.    PAST HISTORY     Past Medical History:  Past Medical History:   Diagnosis Date    Anxiety disorder     Chest pain     Depression     Fatigue     Frequent headaches     Muscle pain     Muscle weakness     Snoring

## 2025-05-04 ENCOUNTER — APPOINTMENT (OUTPATIENT)
Facility: HOSPITAL | Age: 45
DRG: 552 | End: 2025-05-04
Payer: COMMERCIAL

## 2025-05-04 LAB
ALBUMIN SERPL-MCNC: 4 G/DL (ref 3.5–5)
ALBUMIN/GLOB SERPL: 1.2 (ref 1.1–2.2)
ALP SERPL-CCNC: 55 U/L (ref 45–117)
ALT SERPL-CCNC: 30 U/L (ref 12–78)
ANION GAP SERPL CALC-SCNC: 6 MMOL/L (ref 2–12)
ANION GAP SERPL CALC-SCNC: 6 MMOL/L (ref 2–12)
AST SERPL-CCNC: 14 U/L (ref 15–37)
BASOPHILS # BLD: 0.03 K/UL (ref 0–0.1)
BASOPHILS NFR BLD: 0.5 % (ref 0–1)
BILIRUB SERPL-MCNC: 0.4 MG/DL (ref 0.2–1)
BUN SERPL-MCNC: 11 MG/DL (ref 6–20)
BUN SERPL-MCNC: 12 MG/DL (ref 6–20)
BUN/CREAT SERPL: 10 (ref 12–20)
BUN/CREAT SERPL: 13 (ref 12–20)
CALCIUM SERPL-MCNC: 8.9 MG/DL (ref 8.5–10.1)
CALCIUM SERPL-MCNC: 9.2 MG/DL (ref 8.5–10.1)
CHLORIDE SERPL-SCNC: 106 MMOL/L (ref 97–108)
CHLORIDE SERPL-SCNC: 109 MMOL/L (ref 97–108)
CO2 SERPL-SCNC: 26 MMOL/L (ref 21–32)
CO2 SERPL-SCNC: 27 MMOL/L (ref 21–32)
CREAT SERPL-MCNC: 0.96 MG/DL (ref 0.7–1.3)
CREAT SERPL-MCNC: 1.09 MG/DL (ref 0.7–1.3)
DIFFERENTIAL METHOD BLD: ABNORMAL
EOSINOPHIL # BLD: 0.07 K/UL (ref 0–0.4)
EOSINOPHIL NFR BLD: 1.1 % (ref 0–7)
ERYTHROCYTE [DISTWIDTH] IN BLOOD BY AUTOMATED COUNT: 12.9 % (ref 11.5–14.5)
ERYTHROCYTE [DISTWIDTH] IN BLOOD BY AUTOMATED COUNT: 12.9 % (ref 11.5–14.5)
GLOBULIN SER CALC-MCNC: 3.4 G/DL (ref 2–4)
GLUCOSE SERPL-MCNC: 103 MG/DL (ref 65–100)
GLUCOSE SERPL-MCNC: 90 MG/DL (ref 65–100)
HCT VFR BLD AUTO: 40.3 % (ref 36.6–50.3)
HCT VFR BLD AUTO: 41.7 % (ref 36.6–50.3)
HGB BLD-MCNC: 12.8 G/DL (ref 12.1–17)
HGB BLD-MCNC: 13.1 G/DL (ref 12.1–17)
IMM GRANULOCYTES # BLD AUTO: 0.02 K/UL (ref 0–0.04)
IMM GRANULOCYTES NFR BLD AUTO: 0.3 % (ref 0–0.5)
LYMPHOCYTES # BLD: 2.31 K/UL (ref 0.8–3.5)
LYMPHOCYTES NFR BLD: 35.8 % (ref 12–49)
MAGNESIUM SERPL-MCNC: 2 MG/DL (ref 1.6–2.4)
MAGNESIUM SERPL-MCNC: 2.2 MG/DL (ref 1.6–2.4)
MCH RBC QN AUTO: 25.6 PG (ref 26–34)
MCH RBC QN AUTO: 25.9 PG (ref 26–34)
MCHC RBC AUTO-ENTMCNC: 31.4 G/DL (ref 30–36.5)
MCHC RBC AUTO-ENTMCNC: 31.8 G/DL (ref 30–36.5)
MCV RBC AUTO: 81.4 FL (ref 80–99)
MCV RBC AUTO: 81.6 FL (ref 80–99)
MONOCYTES # BLD: 0.6 K/UL (ref 0–1)
MONOCYTES NFR BLD: 9.3 % (ref 5–13)
NEUTS SEG # BLD: 3.43 K/UL (ref 1.8–8)
NEUTS SEG NFR BLD: 53 % (ref 32–75)
NRBC # BLD: 0 K/UL (ref 0–0.01)
NRBC # BLD: 0 K/UL (ref 0–0.01)
NRBC BLD-RTO: 0 PER 100 WBC
NRBC BLD-RTO: 0 PER 100 WBC
PHOSPHATE SERPL-MCNC: 3.7 MG/DL (ref 2.6–4.7)
PHOSPHATE SERPL-MCNC: 3.9 MG/DL (ref 2.6–4.7)
PLATELET # BLD AUTO: 194 K/UL (ref 150–400)
PLATELET # BLD AUTO: 217 K/UL (ref 150–400)
PMV BLD AUTO: 10.5 FL (ref 8.9–12.9)
PMV BLD AUTO: 10.6 FL (ref 8.9–12.9)
POTASSIUM SERPL-SCNC: 3.8 MMOL/L (ref 3.5–5.1)
POTASSIUM SERPL-SCNC: 3.8 MMOL/L (ref 3.5–5.1)
PROT SERPL-MCNC: 7.4 G/DL (ref 6.4–8.2)
RBC # BLD AUTO: 4.95 M/UL (ref 4.1–5.7)
RBC # BLD AUTO: 5.11 M/UL (ref 4.1–5.7)
SODIUM SERPL-SCNC: 139 MMOL/L (ref 136–145)
SODIUM SERPL-SCNC: 141 MMOL/L (ref 136–145)
TROPONIN I SERPL HS-MCNC: 6 NG/L (ref 0–76)
WBC # BLD AUTO: 6.2 K/UL (ref 4.1–11.1)
WBC # BLD AUTO: 6.5 K/UL (ref 4.1–11.1)

## 2025-05-04 PROCEDURE — 6370000000 HC RX 637 (ALT 250 FOR IP)

## 2025-05-04 PROCEDURE — 1100000003 HC PRIVATE W/ TELEMETRY

## 2025-05-04 PROCEDURE — 97161 PT EVAL LOW COMPLEX 20 MIN: CPT

## 2025-05-04 PROCEDURE — 80053 COMPREHEN METABOLIC PANEL: CPT

## 2025-05-04 PROCEDURE — 97162 PT EVAL MOD COMPLEX 30 MIN: CPT

## 2025-05-04 PROCEDURE — 36415 COLL VENOUS BLD VENIPUNCTURE: CPT

## 2025-05-04 PROCEDURE — 6360000002 HC RX W HCPCS: Performed by: STUDENT IN AN ORGANIZED HEALTH CARE EDUCATION/TRAINING PROGRAM

## 2025-05-04 PROCEDURE — 83735 ASSAY OF MAGNESIUM: CPT

## 2025-05-04 PROCEDURE — 84484 ASSAY OF TROPONIN QUANT: CPT

## 2025-05-04 PROCEDURE — 99222 1ST HOSP IP/OBS MODERATE 55: CPT | Performed by: PSYCHIATRY & NEUROLOGY

## 2025-05-04 PROCEDURE — 93005 ELECTROCARDIOGRAM TRACING: CPT | Performed by: INTERNAL MEDICINE

## 2025-05-04 PROCEDURE — 97165 OT EVAL LOW COMPLEX 30 MIN: CPT

## 2025-05-04 PROCEDURE — 6370000000 HC RX 637 (ALT 250 FOR IP): Performed by: STUDENT IN AN ORGANIZED HEALTH CARE EDUCATION/TRAINING PROGRAM

## 2025-05-04 PROCEDURE — 86160 COMPLEMENT ANTIGEN: CPT

## 2025-05-04 PROCEDURE — 97530 THERAPEUTIC ACTIVITIES: CPT

## 2025-05-04 PROCEDURE — 85025 COMPLETE CBC W/AUTO DIFF WBC: CPT

## 2025-05-04 PROCEDURE — 2500000003 HC RX 250 WO HCPCS: Performed by: STUDENT IN AN ORGANIZED HEALTH CARE EDUCATION/TRAINING PROGRAM

## 2025-05-04 PROCEDURE — 84100 ASSAY OF PHOSPHORUS: CPT

## 2025-05-04 PROCEDURE — 83520 IMMUNOASSAY QUANT NOS NONAB: CPT

## 2025-05-04 PROCEDURE — 85027 COMPLETE CBC AUTOMATED: CPT

## 2025-05-04 RX ORDER — HYDROXYZINE HYDROCHLORIDE 10 MG/1
10 TABLET, FILM COATED ORAL
Status: DISCONTINUED | OUTPATIENT
Start: 2025-05-04 | End: 2025-05-05 | Stop reason: HOSPADM

## 2025-05-04 RX ORDER — OXYCODONE HYDROCHLORIDE 5 MG/1
5 TABLET ORAL
Refills: 0 | Status: DISCONTINUED | OUTPATIENT
Start: 2025-05-04 | End: 2025-05-05 | Stop reason: HOSPADM

## 2025-05-04 RX ORDER — SIMETHICONE 80 MG
80 TABLET,CHEWABLE ORAL EVERY 6 HOURS PRN
Status: DISCONTINUED | OUTPATIENT
Start: 2025-05-04 | End: 2025-05-05 | Stop reason: HOSPADM

## 2025-05-04 RX ADMIN — ACETAMINOPHEN 650 MG: 325 TABLET ORAL at 05:13

## 2025-05-04 RX ADMIN — LIDOCAINE HYDROCHLORIDE 40 ML: 20 SOLUTION ORAL at 23:18

## 2025-05-04 RX ADMIN — ENOXAPARIN SODIUM 40 MG: 100 INJECTION SUBCUTANEOUS at 08:38

## 2025-05-04 RX ADMIN — DICLOFENAC SODIUM 4 G: 10 GEL TOPICAL at 08:35

## 2025-05-04 RX ADMIN — ACETAMINOPHEN 650 MG: 325 TABLET ORAL at 22:12

## 2025-05-04 RX ADMIN — DICLOFENAC SODIUM 4 G: 10 GEL TOPICAL at 21:01

## 2025-05-04 RX ADMIN — SODIUM CHLORIDE, PRESERVATIVE FREE 10 ML: 5 INJECTION INTRAVENOUS at 21:01

## 2025-05-04 RX ADMIN — ASPIRIN 81 MG: 81 TABLET, CHEWABLE ORAL at 08:38

## 2025-05-04 RX ADMIN — ROSUVASTATIN CALCIUM 40 MG: 40 TABLET, FILM COATED ORAL at 21:00

## 2025-05-04 ASSESSMENT — PAIN DESCRIPTION - ORIENTATION
ORIENTATION: RIGHT
ORIENTATION: RIGHT;LEFT
ORIENTATION: RIGHT

## 2025-05-04 ASSESSMENT — PAIN DESCRIPTION - DESCRIPTORS
DESCRIPTORS: ACHING;BURNING
DESCRIPTORS: ACHING
DESCRIPTORS: ACHING
DESCRIPTORS: ACHING;SORE
DESCRIPTORS: ACHING;SORE

## 2025-05-04 ASSESSMENT — PAIN DESCRIPTION - LOCATION
LOCATION: BACK;NECK
LOCATION: NECK
LOCATION: KNEE;BACK
LOCATION: KNEE
LOCATION: KNEE;NECK

## 2025-05-04 ASSESSMENT — PAIN SCALES - GENERAL
PAINLEVEL_OUTOF10: 6
PAINLEVEL_OUTOF10: 7
PAINLEVEL_OUTOF10: 6
PAINLEVEL_OUTOF10: 6
PAINLEVEL_OUTOF10: 8
PAINLEVEL_OUTOF10: 3

## 2025-05-04 NOTE — PLAN OF CARE
Problem: Discharge Planning  Goal: Discharge to home or other facility with appropriate resources  5/4/2025 0956 by Yudi Mitchell RN  Outcome: Progressing  5/3/2025 2114 by Rahul Boss RN  Outcome: Progressing     Problem: Pain  Goal: Verbalizes/displays adequate comfort level or baseline comfort level  5/4/2025 0956 by Yudi Mitchell RN  Outcome: Progressing  5/3/2025 2114 by Rahul Boss RN  Outcome: Progressing     Problem: Safety - Adult  Goal: Free from fall injury  5/4/2025 0956 by Yudi Mitchell RN  Outcome: Progressing  5/3/2025 2114 by Rahul Boss RN  Outcome: Progressing     Problem: Neurosensory - Adult  Goal: Achieves stable or improved neurological status  5/4/2025 0956 by Yudi Mitchell RN  Outcome: Progressing  5/3/2025 2114 by Rahul Boss RN  Outcome: Progressing  Goal: Absence of seizures  5/4/2025 0956 by Yudi Mitchell RN  Outcome: Progressing  5/3/2025 2114 by Rahul Boss RN  Outcome: Progressing  Goal: Remains free of injury related to seizures activity  5/4/2025 0956 by Yudi Mitchell RN  Outcome: Progressing  5/3/2025 2114 by Rahul Boss RN  Outcome: Progressing  Goal: Achieves maximal functionality and self care  5/4/2025 0956 by Yudi Mitchell RN  Outcome: Progressing  5/3/2025 2114 by Rahul Boss RN  Outcome: Progressing     Problem: Skin/Tissue Integrity - Adult  Goal: Skin integrity remains intact  5/4/2025 0956 by Yudi Mitchell RN  Outcome: Progressing  5/3/2025 2114 by Rahul Boss RN  Outcome: Progressing     Problem: Infection - Adult  Goal: Absence of infection at discharge  5/4/2025 0956 by Yudi Mitchell RN  Outcome: Progressing  5/3/2025 2114 by Rahul Boss RN  Outcome: Progressing     Problem: Hematologic - Adult  Goal: Maintains hematologic stability  5/4/2025 0956 by Yudi Mitchell RN  Outcome: Progressing  5/3/2025 2114 by Rahul Boss RN  Outcome: Progressing

## 2025-05-04 NOTE — CONSULTS
[facility]  [address] [place] [address]    Neurology Consultation    Date: May 4, 2025    Tacos Raymond Sr.  [id number]  1980  [address] [place] [address]    Primary Care Physician:  Mervat Lang MD  [unfilled]  [phone]   [phone]    Referring Physician:  Kun Wyatt MD    Impression: This is a gentleman with paresthesias and dysesthesias over the last month or 2, possibly aggravated by some extent by anxiety and stress.  He has an elevated vitamin B6 level which has in the past been associated with toxic effects on sensory ganglia most notably in the pregnant population a few decades ago when they were receiving high-dose vitamin B6 presumably to benefit the fetus.    Plan and Recommendations: He will discontinue his multivitamin and try to avoid to some extent vitamin B6 rich foods.  Neurosurgery has recommended MRI of the brain with contrast regarding this lytic skull base lesion.  Will defer to them in regards to further workup.  He will follow-up with Dr. Drew, his neurologist with [facility], who he has seen recently for these issues.    Daniel Ortiz., M.D.    Diplomate, American Board of Neurology and Psychiatry  Diplomate, American Board of Electrodiagnostic Medicine  ______________________________________________________________________    Reason for Consultation: Tacos Raymond is a 44 y.o. y/o male who we are asked to see in consultation for sensory symptoms.    History of Present Illness: This gentleman has had some sensory symptoms with scattered variable numbness for the last year.  Is gotten worse in the last month or 2.  It seems to be somewhat migratory in nature and located in essentially any area of the body.  He has seen a neurologist recently who ordered a number of tests and was found to have an elevated B6 level.  He presented here primarily to get an accelerated MRI study of the head.  The MRI

## 2025-05-04 NOTE — PROGRESS NOTES
PHYSICAL THERAPY EVALUATION/DISCHARGE    Patient: Tacos Raymond Sr. (44 y.o. male)  Date: 5/4/2025  Primary Diagnosis: Tension headache [G44.209]  Paresthesia [R20.2]  Neurologic abnormality [R29.818]       Precautions: Restrictions/Precautions  Restrictions/Precautions: Fall Risk            ASSESSMENT AND RECOMMENDATIONS:  Based on the objective data below, the patient presents near his baseline level of function. He reports still having numbness/tingling on R side of body, particularly hand/wrist and below the knee. No report of vision or speech changes. MRI and CT of head were both negative for CVA. Noted some mild trunk sway and antalgic gait pattern on R during walking. Recommend patient wear shoes with backs (patient had on flip flops today) for improved balance and less risk of falling. He reported increased head \"soreness\" (on top of his head) after walking. Noted some over-correction and jumping during nystagmus testing. Patient also endorses blurriness and dizziness with looking down. Recommend OP PT for vestibular rehab and balance. No further acute PT needs identified. Will sign off and complete order. At end of session patient was left supine in bed with bed alarm activated, call bell within reach, no complaints.     Functional Outcome Measure:  The patient scored 23/28 on the Tinetti outcome measure which is indicative of moderate fall risk.          Further skilled acute physical therapy is not indicated at this time.       PLAN :  Recommendation for discharge: (in order for the patient to meet his/her long term goals):   Outpatient physical therapy for vestibular and balance    Other factors to consider for discharge: no additional factors    IF patient discharges home will need the following DME: none       SUBJECTIVE:   Patient stated “I think I slept on my wrist funny.”    OBJECTIVE DATA SUMMARY:     Past Medical History:   Diagnosis Date    Anxiety disorder     Chest pain     Depression      Intact  Standing: Intact  Ambulation/Gait Training:    Gait  Gait Training: Yes  Overall Level of Assistance: Modified independent  Distance (ft): 100 Feet  Assistive Device: Gait belt  Interventions: Verbal cues;Safety awareness training  Base of Support: Shift to left  Speed/Loida: Pace decreased (< 100 feet/min)  Stance: Right decreased  Gait Abnormalities: Antalgic;Altered arm swing;Trunk sway increased     Wheelchair Management  Wheelchair Management: No                                                                                                                                                                                                                                    Tinetti test:    Tinetti  Sitting Balance: Steady, safe  Arises: Able, uses arms to help  Attempts to Arise: Able to arise, one attempt  Immediate Standing Balance (First 5 Seconds): Steady without walker or other support  Standing Balance: Narrow stance without support  Nudged: Staggers, grabs, catches self  Eyes Closed: Steady  Turned 360 Degrees: Steadiness: Steady  Turned 360 Degrees: Continuity of Steps: Discontinuous steps  Sitting Down: Safe, smooth motion  Balance Score: 13  Initiation of Gait: No hesitancy  Step Height: R Swing Foot: Right foot complete clears floor  Step Length: R Swing Foot: Passes left stance foot  Step Height: L Swing Foot: Left foot complete clears floor  Step Length: L Swing Foot: Passes right stance foot  Step Symmetry: Right and left step appear equal  Step Continuity: Steps appear continuous  Path: Mild/moderate deviation or uses walking aid  Trunk: No sway, no flexion, no use of arms, no walking aid  Walking Time: Heels apart  Gait Score: 10  Tinetti Total Score: 23           Tinetti Tool Score Risk of Falls  <19 = High Fall Risk  19-24 = Moderate Fall Risk  25-28 = Low Fall Risk  Tinetti ME. Performance-Oriented Assessment of Mobility Problems in Elderly Patients. JAGS 1986; 34:119-126. (Scoring

## 2025-05-04 NOTE — PROGRESS NOTES
OCCUPATIONAL THERAPY EVALUATION/DISCHARGE  Patient: Tacos Raymond  (44 y.o. male)  Date: 5/4/2025  Primary Diagnosis: Tension headache [G44.209]  Paresthesia [R20.2]  Neurologic abnormality [R29.818]         Precautions:                    ASSESSMENT :  Based on the objective data below, the patient presents with RLE paresthesias, now relatively improved since initial onset. Of note, CT and MRI negative for acute process. This date, patient demonstrates grossly equal BUE strength, sensation, coordination, and AROM. He tolerates dynamic balance challenges and functional mobility trial without difficulty, benefiting from up to supervision for safety in novel environment. Intermittent complaints of back pain with patient receptive to education re: compensatory ADL/mobility strategies for pain relief and joint integrity. At this time, patient with no further acute OT needs. Will sign off.     Functional Outcome Measure:  The patient scored 66/66 on the Fugl Berg.     Further skilled acute occupational therapy is not indicated at this time.     PLAN :  Recommend with staff: supervision for safety    Recommendation for discharge: (in order for the patient to meet his/her long term goals):   No skilled occupational therapy    Other factors to consider for discharge:     IF patient discharges home will need the following DME:      SUBJECTIVE:   Patient stated, “My right arm and leg felt off when I came in but that's gotten better.”    OBJECTIVE DATA SUMMARY:     Past Medical History:   Diagnosis Date    Anxiety disorder     Chest pain     Depression     Fatigue     Frequent headaches     Muscle pain     Muscle weakness     Snoring      Past Surgical History:   Procedure Laterality Date    WISDOM TOOTH EXTRACTION         Prior Level of Function/Environment/Context: Prior Level of Assist for ADLs: Independent,  ,  ,  ,  ,  , Prior Level of Assist for Homemaking: Independent,  , Prior Level of Assist for Transfers:

## 2025-05-04 NOTE — PLAN OF CARE
Problem: Discharge Planning  Goal: Discharge to home or other facility with appropriate resources  Outcome: Progressing     Problem: Pain  Goal: Verbalizes/displays adequate comfort level or baseline comfort level  Outcome: Progressing     Problem: Safety - Adult  Goal: Free from fall injury  Outcome: Progressing     Problem: Neurosensory - Adult  Goal: Achieves stable or improved neurological status  Outcome: Progressing  Goal: Absence of seizures  Outcome: Progressing  Goal: Remains free of injury related to seizures activity  Outcome: Progressing  Goal: Achieves maximal functionality and self care  Outcome: Progressing     Problem: Skin/Tissue Integrity - Adult  Goal: Skin integrity remains intact  Outcome: Progressing     Problem: Infection - Adult  Goal: Absence of infection at discharge  Outcome: Progressing     Problem: Hematologic - Adult  Goal: Maintains hematologic stability  Outcome: Progressing

## 2025-05-04 NOTE — PROGRESS NOTES
End of Shift Note    Bedside shift change report given to  KAREN Kay  (oncoming nurse) by Yudi Mitchell RN .        Shift worked:  7a-7p   Shift summary and any significant changes:     New admission, database complete. No acute changes. Patient still complaining of pain on the right side, particularly in the neck/shoulder area and the knee. Patient normally takes Lyrica prn at home. MD aware.    MRI w/ contrast ordered     Concerns for physician to address:  See above   Zone phone for oncoming shift:   [phone]     Patient Information  Tacos Santillanall Sr.  44 y.o.  5/3/2025  4:36 AM by Kun Wyatt MD. Tacos Santillanall Sr. 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 11/01/2022     Past Medical History:   Diagnosis Date    Anxiety disorder     Chest pain     Depression     Fatigue     Frequent headaches     Muscle pain     Muscle weakness     Snoring        Core Measures:  CVA: yes  CHF: no  PNA: no    Activity:Level of Assistance: Independent  Number times ambulated in hallways past shift: 0  Number of times OOB to chair past shift: 0    Cardiac:   Cardiac Monitoring: yes, SR    Access:   Current line(s): PIV    Respiratory:   O2 Device: None (Room air)    GI:  Last BM (including prior to admit): 05/02/25  Current diet:  ADULT DIET; Regular; No spinich or dark greens, no pinapple or bananas  Tolerating current diet: Yes    Pain Management:   Patient states pain is manageable on current regimen: yes    Skin:  Natalio Scale Score: 21  Interventions: N/A  Pressure injury: no    Patient Safety:  Fall Score: Ernandez Total Score: 30  Interventions: bed alarm  Self-release roll belt: No  Dexterity to release roll belt: yes   (must document dexterity  here by stating Yes or No here, otherwise this is a restraint and must follow restraint documentation policy.)    DVT prophylaxis:  DVT  prophylaxis: meds    Active Consults:  IP CONSULT TO CASE MANAGEMENT  IP CONSULT TO NEUROLOGY  IP CONSULT TO NEUROSURGERY    Length of Stay:  Expected LOS: 3  Actual LOS: 1    Yudi Mitchell RN

## 2025-05-05 ENCOUNTER — APPOINTMENT (OUTPATIENT)
Facility: HOSPITAL | Age: 45
DRG: 552 | End: 2025-05-05
Payer: COMMERCIAL

## 2025-05-05 VITALS
SYSTOLIC BLOOD PRESSURE: 138 MMHG | OXYGEN SATURATION: 98 % | BODY MASS INDEX: 29.18 KG/M2 | TEMPERATURE: 97.5 F | WEIGHT: 197 LBS | HEIGHT: 69 IN | RESPIRATION RATE: 14 BRPM | HEART RATE: 65 BPM | DIASTOLIC BLOOD PRESSURE: 90 MMHG

## 2025-05-05 LAB
C3 SERPL-MCNC: 134 MG/DL (ref 82–167)
C4 SERPL-MCNC: 27 MG/DL (ref 12–38)

## 2025-05-05 PROCEDURE — A9579 GAD-BASE MR CONTRAST NOS,1ML: HCPCS | Performed by: INTERNAL MEDICINE

## 2025-05-05 PROCEDURE — 2500000003 HC RX 250 WO HCPCS: Performed by: STUDENT IN AN ORGANIZED HEALTH CARE EDUCATION/TRAINING PROGRAM

## 2025-05-05 PROCEDURE — 6360000002 HC RX W HCPCS: Performed by: STUDENT IN AN ORGANIZED HEALTH CARE EDUCATION/TRAINING PROGRAM

## 2025-05-05 PROCEDURE — 70553 MRI BRAIN STEM W/O & W/DYE: CPT

## 2025-05-05 PROCEDURE — 6360000004 HC RX CONTRAST MEDICATION: Performed by: INTERNAL MEDICINE

## 2025-05-05 PROCEDURE — 6370000000 HC RX 637 (ALT 250 FOR IP): Performed by: STUDENT IN AN ORGANIZED HEALTH CARE EDUCATION/TRAINING PROGRAM

## 2025-05-05 RX ORDER — LIDOCAINE 4 G/G
1 PATCH TOPICAL EVERY 12 HOURS
Qty: 8 EACH | Refills: 0 | Status: SHIPPED | OUTPATIENT
Start: 2025-05-05 | End: 2025-05-09

## 2025-05-05 RX ORDER — IBUPROFEN 200 MG
400 TABLET ORAL EVERY 8 HOURS PRN
Status: SHIPPED | COMMUNITY
Start: 2025-05-05

## 2025-05-05 RX ORDER — ACETAMINOPHEN 325 MG/1
650 TABLET ORAL EVERY 6 HOURS PRN
Status: SHIPPED | COMMUNITY
Start: 2025-05-05

## 2025-05-05 RX ADMIN — SODIUM CHLORIDE, PRESERVATIVE FREE 10 ML: 5 INJECTION INTRAVENOUS at 09:42

## 2025-05-05 RX ADMIN — GADOTERIDOL 20 ML: 279.3 INJECTION, SOLUTION INTRAVENOUS at 09:06

## 2025-05-05 RX ADMIN — ENOXAPARIN SODIUM 40 MG: 100 INJECTION SUBCUTANEOUS at 09:41

## 2025-05-05 RX ADMIN — ASPIRIN 81 MG: 81 TABLET, CHEWABLE ORAL at 09:41

## 2025-05-05 RX ADMIN — DICLOFENAC SODIUM 4 G: 10 GEL TOPICAL at 09:48

## 2025-05-05 ASSESSMENT — PAIN SCALES - GENERAL: PAINLEVEL_OUTOF10: 0

## 2025-05-05 NOTE — PROGRESS NOTES
Nocturnist/cross cover provider called to Room 134 as RRT was reported to be called for CP.     Patient Vitals for the past 8 hrs:   BP Temp Temp src Pulse Resp SpO2   05/04/25 2242 (!) 148/78 -- -- 64 18 97 %   05/04/25 2057 121/86 97.7 °F (36.5 °C) Oral 64 18 98 %     No intake or output data in the 24 hours ending 05/04/25 2250      BACKGROUND/ SITUATION:  Attending provider following patient: Andrea Miguel Jr., MD     44 y.o. male h/o  has a past medical history of Anxiety disorder, Chest pain, Depression, Fatigue, Frequent headaches, Muscle pain, Muscle weakness, and Snoring.   admitted 5/3/2025 for Tension headache [G44.209]  Paresthesia [R20.2]  Neurologic abnormality [R29.818].  See prior hospitalist group notes for complete details of course of treatment.      FINDINGS:  On presentation to the patient's room patient is in bed complaining of restlessness, shaky legs, neck pain, back pain, chest pain, RLQ pain, and headache. He states he gets like this when he has a medication reaction. Recently received voltaren, crestor, tylenol. Denies SOB. States the pain is 9/10, denies cardiac hx, no significant difference in BP in the R arm and L arm. No rebound tenderness on palpation to RLQ, abd is not distended, this pain seems to have passed. Some pain with palpation to the back. No CVA or suprapubic tenderness.      -EKG upon my initial review revealed NSR- awaiting final cardiology reading.     Patient denies any other concerns or complaints on exam. No acute distress noted on exam.    Total 12 point ROS reviewed, and was negative aside from as mentioned above.    Physical Examination:   General appearance: alert, NAD  Head: Atraumatic, normocephalic  Eyes: PERRL. EOMI bilaterally. No scleral icterus.    ENT: Oral mucosa is moist and free of lesions.  No tracheal deviation.  No JVD.    Respiratory: Normal respiratory effort. CTAB, No wheezes, crackles or stridor, on RA  Cardiovascular: Regular rate and

## 2025-05-05 NOTE — PROGRESS NOTES
Date:5/5/2025      Re: Mervat Guo     To whom it may concern;     Please excuse  Mervat Guo from work on 5/5/2025 as She had to help her significant other Tacos Fountain with  as he was under my care in the hospital for a medical condition.  please call with questions.     Thank you     Andrea [place] [address]     Phone [phone]  Fax [phone]

## 2025-05-05 NOTE — PROGRESS NOTES
End of Shift Note    Bedside shift change report given to  Sultana,RN, RN  (oncoming nurse) by Rahul Boss RN .        Shift worked: Night   N   Patient complaining of \"a knot on my left chest\" that he rates a 10/10, shaky legs, back pain, headache, and chest pain. Rapid response was activated for chest ,Pippa Lewis NP and rapid response team came to the bedside. EKG DONE,blood works done.        Concerns for physician to address:  See above   Zone phone for oncoming shift:   [phone]     Patient Information  Tacos Raymond Sr.  45 y.o.  5/3/2025  4:36 AM by Kun Wyatt MD. Tacos Raymond Sr. was admitted from [facility]    Problem List  Patient Active Problem List    Diagnosis Date Noted    Neurologic abnormality 05/03/2025    PCR positive for herpes simplex virus type 1 (HSV-1) DNA 11/01/2022     Past Medical History:   Diagnosis Date    Anxiety disorder     Chest pain     Depression     Fatigue     Frequent headaches     Muscle pain     Muscle weakness     Snoring        Core Measures:  CVA: yes  CHF: no  PNA: no    Activity:Level of Assistance: Independent  Number times ambulated in hallways past shift: 0  Number of times OOB to chair past shift: 0    Cardiac:   Cardiac Monitoring: yes, SR    Access:   Current line(s): PIV    Respiratory:   O2 Device: None (Room air)    GI:  Last BM (including prior to admit): 05/04/25  Current diet:  ADULT DIET; Regular; No spinich or dark greens, no pinapple or bananas  Tolerating current diet: Yes    Pain Management:   Patient states pain is manageable on current regimen: yes    Skin:  Natalio Scale Score: 21  Interventions: N/A  Pressure injury: no    Patient Safety:  Fall Score: Ernandez Total Score: 30  Interventions: bed alarm  Self-release roll belt: No  Dexterity to release roll belt: yes   (must document dexterity  here by stating Yes or No here, otherwise this is a restraint and must follow restraint documentation policy.)    DVT prophylaxis:  DVT prophylaxis:

## 2025-05-05 NOTE — DISCHARGE INSTRUCTIONS
No dietary supplements, multivitamins, pyridoxine/vit B6 supplements     Possibly the neurologic symptoms are all related to too much B6     May worsen initially but then gradually should improve as

## 2025-05-05 NOTE — CARE COORDINATION
Care Management Initial Assessment       RUR: 4%  Readmission? No  1st IM letter given? No  1st  letter given: No    Chart reviewed. Patient discussed in rounds and should discharge today.    CM met with patient and his father. He stated that he is going to the home of family in [place]. His father is his . Therapy did not recommend addition needs or equipment. No discharge needs requiring CM intervention, identified. States he can  any needed medication at [facility].      Okay to dc from CM standpoint.     05/05/25 5861   Service Assessment   Patient Orientation Alert and Oriented   Cognition Alert   History Provided By Patient   Primary Caregiver Self   Support Systems Family Members   Patient's Healthcare Decision Maker is: Patient Declined (Legal Next of Kin Remains as Decision Maker)   PCP Verified by CM Yes  (Mine Ignacio, NP-PC)   Prior Functional Level Independent in ADLs/IADLs   Current Functional Level Independent in ADLs/IADLs   Can patient return to prior living arrangement Yes  (patient states he will be staying with family in [place], once he leaves the hospital)   Ability to make needs known: Good   Family able to assist with home care needs: Yes   Would you like for me to discuss the discharge plan with any other family members/significant others, and if so, who? Yes  (father, Austyn Barrera, in the room)   Financial Resources Other (Comment)  (private insurance)   Community Resources None   Social/Functional History   Lives With Family   Type of Home House   Home Equipment None   Active  Yes   Discharge Planning   Type of Residence House   Living Arrangements Family Members   Current Services Prior To Admission None   DME Ordered? No   Type of Home Care Services None   Patient expects to be discharged to: House   Services At/After Discharge   Transition of Care Consult (CM Consult) Discharge Planning   Services At/After Discharge None    Resource Information Provided?

## 2025-05-05 NOTE — PROGRESS NOTES
1147 - NEUROLOGY  follow-up transitional care appointment has been scheduled with Dr. Skip Drew on 7/28/25 1030. This is a previously scheduled appt and currently first available. . Pending patient discharge.     Attempted to schedule PCP hospital follow up appointment. Unable to reach anyone, unable to leave voicemail. Pending patient discharge.

## 2025-05-05 NOTE — PROGRESS NOTES
End of Shift Note    Bedside shift change report given to  Sultana,RN, RN  (oncoming nurse) by Rahul Boss, RN .        Shift worked: Night   N   Patient complaining of \"a knot on my left chest\" that he rates a 10/10, shaky legs, back pain, headache, and chest pain. Rapid response was activated for chest ,Pippa Lewis NP and rapid response team came to the bedside. EKG DONE,blood works done. The NP ordered simethicone, gi cocktail, oxy, atarax, heat, ice, rest .        Concerns for physician to address:  See above   Zone phone for oncoming shift:   [phone]     Patient Information  Tacos URSULA Raymond Sr.  45 y.o.  5/3/2025  4:36 AM by Kun Wyatt MD. 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 1 (HSV-1) DNA 11/01/2022     Past Medical History:   Diagnosis Date    Anxiety disorder     Chest pain     Depression     Fatigue     Frequent headaches     Muscle pain     Muscle weakness     Snoring        Core Measures:  CVA: yes  CHF: no  PNA: no    Activity:Level of Assistance: Independent  Number times ambulated in hallways past shift: 0  Number of times OOB to chair past shift: 0    Cardiac:   Cardiac Monitoring: yes, SR    Access:   Current line(s): PIV    Respiratory:   O2 Device: None (Room air)    GI:  Last BM (including prior to admit): 05/04/25  Current diet:  ADULT DIET; Regular; No spinich or dark greens, no pinapple or bananas  Tolerating current diet: Yes    Pain Management:   Patient states pain is manageable on current regimen: yes    Skin:  Natalio Scale Score: 21  Interventions: N/A  Pressure injury: no    Patient Safety:  Fall Score: Ernandez Total Score: 30  Interventions: bed alarm  Self-release roll belt: No  Dexterity to release roll belt: yes   (must document dexterity  here by stating Yes or No here, otherwise this is a restraint and must follow restraint

## 2025-05-05 NOTE — PROGRESS NOTES
Speech Pathology Contact Note:    Orders received and appreciated for SLP evaluation. Per discussion with RN and patient, no concerns for speech/communication changes or swallowing difficulty. Previously reported facial numbness is now resolved (patient reports numbness was towards his forehead and ear). Patient currently tolerating regular diet with thin liquids without concern. MRI Brain 5/3- \"No acute intracranial findings.\" No further acute SLP services warranted at this time. SLP will sign off. Please re-consult if concerns arise in the future. Thanks!     Kianna Palm CCC-SLP

## 2025-05-05 NOTE — SIGNIFICANT EVENT
RAPID RESPONSE TEAM    Overhead rapid response paged to room #134/01 at 2333    Reason for rapid response:  Chest pain    Background:  Patient admitted on 5/3/2025. The primary encounter diagnosis was Paresthesia. A diagnosis of Tension headache was also pertinent to this visit..       Assessment/Interventions:  Patient Aox4 complaining of \"a knot on my left chest\" that he rates a 10/10, shaky legs, back pain, headache, and chest pain. Patient states that he feels this way when he has a \"medication reaction.\" Patient recently received voltaren, tylenol, and crestor, all of which he states he takes at home.     IFEANYI Lewis at bedside, orders received for the following Interventions: EKG, CBC, CMP, magnesium, phosphorus, troponin, and continuous telemetry monitoring. Various ORN medications ordered as well including: GI cocktail, simethicone, hydroxyzine, and oxycodone should the patient need them.         Outcome:  Patient to remain in room #134/01    Rapid response ended at 2300.    Please call with any questions or concerns    Nae Duran RN  Rapid Response Team  Ext [phone]        Patient Vitals for the past 12 hrs:   BP Temp Temp src Pulse Resp SpO2   05/04/25 2242 (!) 148/78 -- -- 64 18 97 %   05/04/25 2057 121/86 97.7 °F (36.5 °C) Oral 64 18 98 %   05/04/25 1437 127/84 97.7 °F (36.5 °C) Oral 65 -- 99 %   05/04/25 1150 134/79 97.5 °F (36.4 °C) Oral 57 -- 98 %        CHEMISTRY CBC/COAG POCT/BG   Lab Results   Component Value Date/Time     05/04/2025 05:08 AM    K 3.8 05/04/2025 05:08 AM    BUN 12 05/04/2025 05:08 AM    CREATININE 0.96 05/04/2025 05:08 AM    ANIONGAP 6 05/04/2025 05:08 AM    MG 2.2 05/04/2025 05:08 AM    GLUCOSE 103 (H) 05/04/2025 05:08 AM    CALCIUM 8.9 05/04/2025 05:08 AM    TROPHS 5 05/03/2025 05:07 AM     Lab Results   Component Value Date/Time    WBC 6.2 05/04/2025 05:08 AM    RBC 4.95 05/04/2025 05:08 AM    HGB 12.8 05/04/2025 05:08 AM    HCT 40.3 05/04/2025 05:08 AM

## 2025-05-05 NOTE — CONSULTS
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  Stable compared to previous imaging.  Recommend follow up MRI in 6 months.  Welcome to follow up with me in the office.

## 2025-05-05 NOTE — PROGRESS NOTES
Speech Pathology Contact Note:    Orders received and appreciated for SLP evaluation per stroke protocol. Patient currently off the floor for MRI. SLP will follow up as patient appropriate. Thanks!     Kianna Palm CCC-SLP

## 2025-05-05 NOTE — PLAN OF CARE
Problem: Discharge Planning  Goal: Discharge to home or other facility with appropriate resources  5/4/2025 2114 by Rahul Boss RN  Outcome: Progressing  5/4/2025 0956 by Yudi Mitchell RN  Outcome: Progressing     Problem: Pain  Goal: Verbalizes/displays adequate comfort level or baseline comfort level  5/4/2025 2114 by Rahul Boss RN  Outcome: Progressing  5/4/2025 0956 by Yudi Mitchell RN  Outcome: Progressing     Problem: Safety - Adult  Goal: Free from fall injury  5/4/2025 2114 by Rahul Boss RN  Outcome: Progressing  5/4/2025 0956 by Yudi Mitchell RN  Outcome: Progressing     Problem: Neurosensory - Adult  Goal: Achieves stable or improved neurological status  5/4/2025 2114 by Rahul Boss RN  Outcome: Progressing  5/4/2025 0956 by Yudi Mitchell RN  Outcome: Progressing  Goal: Absence of seizures  5/4/2025 2114 by Rahul Boss RN  Outcome: Progressing  5/4/2025 0956 by Yudi Mitchell RN  Outcome: Progressing  Goal: Remains free of injury related to seizures activity  5/4/2025 2114 by Rahul Boss RN  Outcome: Progressing  5/4/2025 0956 by Yudi Mitchell RN  Outcome: Progressing  Goal: Achieves maximal functionality and self care  5/4/2025 2114 by Rahul Boss RN  Outcome: Progressing  5/4/2025 0956 by Yudi Mitchell RN  Outcome: Progressing     Problem: Skin/Tissue Integrity - Adult  Goal: Skin integrity remains intact  5/4/2025 2114 by Rahul Boss RN  Outcome: Progressing  5/4/2025 0956 by Yudi Mitchell RN  Outcome: Progressing     Problem: Infection - Adult  Goal: Absence of infection at discharge  5/4/2025 2114 by Rahul Boss RN  Outcome: Progressing  5/4/2025 0956 by Yudi Mitchell RN  Outcome: Progressing     Problem: Hematologic - Adult  Goal: Maintains hematologic stability  5/4/2025 2114 by Rahul Boss RN  Outcome: Progressing  5/4/2025 0956 by Yudi Mitchell RN  Outcome: Progressing

## 2025-05-05 NOTE — PROGRESS NOTES
Hospitalist Progress Note    NAME:   Tacos Raymond Sr.   : 1980   MRN: [id number]     Date: 2025    Patient PCP: Mervat Lang MD    Hospital Problem list:     Possible vit B6 toxicity POA with sensory findings  Right skull hyperintense lesion POA  B/L upper & lower extremity paresthesia  Chronic neck pain  Mild degenerative cervical disc disease, POA  Degenerative IV lumbar disc disease, POA  - Right facial, right arm and leg numbness, neck and bilateral temporal pain  - CT head negative for intracranial process  - CT head and neck negative for intracranial process, vessel occlusion or dissection  - MRI cervical and brain shows 14 x 9 x 8 T2 hyperintense lesion at the right skull base   Contrasted MRI recommended  - Vit B6 level elevated at 38.4   Taking extra supplements at home  - Normal alcohol level, UA and U tox negative  - Troponin WNL  - CMP remarkable for hypokalemia  - CBC negative for leukocytosis  -Outpatient workup  labs were normal that include Lyme's, CK, INES, dsDNA, SCL 70 autoantibodies, Smith autoantibodies, SM/RNP, SS-A/Brittney, zinc level, ceruloplasmin,  - Neurology consult Dr Gavin  \"Impression: This is a gentleman with paresthesias and dysesthesias over the last month or 2, possibly aggravated by some extent by anxiety and stress.  He has an elevated vitamin B6 level which has in the past been associated with toxic effects on sensory ganglia most notably in the pregnant population a few decades ago when they were receiving high-dose vitamin B6 presumably to benefit the fetus.     Plan and Recommendations: He will discontinue his multivitamin and try to avoid to some extent vitamin B6 rich foods.  Neurosurgery has recommended MRI of the brain with contrast regarding this lytic skull base lesion.  Will defer to them in regards to further workup.  He will follow-up with Dr. Drew, his neurologist with [facility], who he has seen recently for these  issues.\"  - PT OT  -Pain control with Tylenol, lidocaine patch, Voltaren gel neck pain, continue home medication Lyrica     CKD stage II  Hypokalemia  - Baseline creatinine, K3.3  - Monitor electrolytes and replace as needed  - Serial labs     Anxiety  Depression  Scoliosis  -Resume home meds as ordered    Code Status: Full code  DVT Prophylaxis: Lovenox  Baseline: Dependent       Medical Decision Making:   I personally reviewed labs: U tox, UA, ethanol, tropes, CMP, lipase, CBC  I personally reviewed imaging:   I personally reviewed EKG:  Toxic drug monitoring: N/A  Discussed case with: Pt, NS    History, assessment and plan for 5/4/2025:     Estimated discharge date: 5/5/2025    Needs to be done before discharge:  MRI brain with IV contrast    Reason for physician visit:    \"Okay\".  Discussed with RN events overnight.     No HA, SOB, cough, CP, abdominal pain, N/V, diarrhea      Total NON critical care TIME:  40  Minutes    Total CRITICAL CARE TIME Spent:   Minutes non procedure based    Objective:     VITALS:   Last 24hrs VS reviewed since prior progress note. Most recent are:  Patient Vitals for the past 24 hrs:   BP Temp Temp src Pulse Resp SpO2   05/04/25 2057 121/86 97.7 °F (36.5 °C) Oral 64 18 98 %   05/04/25 1437 127/84 97.7 °F (36.5 °C) Oral 65 -- 99 %   05/04/25 1150 134/79 97.5 °F (36.4 °C) Oral 57 -- 98 %   05/04/25 0834 119/82 97.3 °F (36.3 °C) Oral 60 20 100 %   05/04/25 0516 134/78 97.5 °F (36.4 °C) Oral 74 18 99 %   05/04/25 0219 121/74 97.7 °F (36.5 °C) Oral 54 18 99 %   05/03/25 2341 125/79 97.3 °F (36.3 °C) Oral 71 18 100 %       No intake or output data in the 24 hours ending 05/04/25 2114     I had a face to face encounter and independently examined this patient on 5/4/2025, as outlined below:    PHYSICAL EXAM:  General: Alert, cooperative  EENT:  Anicteric sclerae.  Resp:  CTA bilaterally, no wheezing or rales.  No accessory muscle use  CV:  Regular  rhythm,  No edema  GI:  Soft, Non

## 2025-05-05 NOTE — DISCHARGE SUMMARY
Hospitalist Discharge Note    NAME:   Tacos Raymond Sr.   : 1980   MRN: [id number]     Admit date: 5/3/2025    Discharge date: 25    PCP: Mervat Lang MD    Discharge Diagnoses:    Discharge Medications:  Current Discharge Medication List        START taking these medications    Details   acetaminophen (TYLENOL) 325 MG tablet Take 2 tablets by mouth every 6 hours as needed for Pain or Fever      ibuprofen (ADVIL) 200 MG tablet Take 2 tablets by mouth every 8 hours as needed for Pain TAKE WITH FOOD      lidocaine 4 % external patch Place 1 patch onto the skin in the morning and 1 patch in the evening. Do all this for 8 doses.  Qty: 8 each, Refills: 0           STOP taking these medications       pregabalin (LYRICA) 75 MG capsule Comments:   Reason for Stopping:               Follow-up Information       Follow up With Specialties Details Why Contact Info    Mervat Lang MD Family Medicine Schedule an appointment as soon as possible for a visit in 1 week(s) To schedule your PCP hospital follow up. [address] [facility] [id number] [phone]      Skip Drew MD Neurology Go on 2025 at 10:30am for your NEUROLOGY follow up as previously scheduled. [address] [facility] [address]  [facility] [address] [phone]      Pamela Lynne MD Neurological Surgery Schedule an appointment as soon as possible for a visit in 6 month(s) follow up basilar skull lesion [address]  [facility] [address] [phone]              Time spent on discharge:   I spent 38 minutes on discharge, seeing and examining the patient, reconciling home meds and new meds, coordinating care with case management, doing the discharge papers and the D/C summary    Discharge disposition:     Discharge Condition: Stable    Summary of admission H+P(copied from Kun Wyatt MD Note):       Hospital course:        Possible vit B6 toxicity POA with sensory findings  Right skull hyperintense lesion

## 2025-05-05 NOTE — PROGRESS NOTES
End of Shift Note    Bedside shift change report given to  Sultana,RN, RN  (oncoming nurse) by Rahul Boss, RN .        Shift worked: Night   N   Patient complaining of \"a knot on my left chest\" that he rates a 10/10, shaky legs, back pain, headache, and chest pain. Rapid response was activated for chest ,Pippa Lewis NP and rapid response team came to the bedside. EKG DONE,blood works done. The NP ordered simethicone, gi cocktail, oxy, atarax, heat, ice, rest .        Concerns for physician to address:  See above   Zone phone for oncoming shift:   [phone]     Patient Information  Tacos URSULA Raymond Sr.  45 y.o.  5/3/2025  4:36 AM by Kun Wyatt MD. 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-1) DNA 11/01/2022     Past Medical History:   Diagnosis Date    Anxiety disorder     Chest pain     Depression     Fatigue     Frequent headaches     Muscle pain     Muscle weakness     Snoring        Core Measures:  CVA: yes  CHF: no  PNA: no    Activity:Level of Assistance: Independent  Number times ambulated in hallways past shift: 0  Number of times OOB to chair past shift: 0    Cardiac:   Cardiac Monitoring: yes, SR    Access:   Current line(s): PIV    Respiratory:   O2 Device: None (Room air)    GI:  Last BM (including prior to admit): 05/04/25  Current diet:  ADULT DIET; Regular; No spinich or dark greens, no pinapple or bananas  Tolerating current diet: Yes    Pain Management:   Patient states pain is manageable on current regimen: yes    Skin:  Natalio Scale Score: 21  Interventions: N/A  Pressure injury: no    Patient Safety:  Fall Score: Ernandez Total Score: 30  Interventions: bed alarm  Self-release roll belt: No  Dexterity to release roll belt: yes   (must document dexterity  here by stating Yes or No here, otherwise this is a restraint and must follow restraint

## 2025-05-06 LAB
C-ANCA TITR SER IF: NORMAL TITER
MYELOPEROXIDASE AB SER IA-ACNC: <0.2 UNITS (ref 0–0.9)
MYELOPEROXIDASE AB SER IA-ACNC: <0.2 UNITS (ref 0–0.9)
P-ANCA ATYPICAL TITR SER IF: NORMAL TITER
P-ANCA TITR SER IF: NORMAL TITER
PROTEINASE3 AB SER IA-ACNC: <0.2 UNITS (ref 0–0.9)

## 2025-05-07 LAB
EKG ATRIAL RATE: 69 BPM
EKG DIAGNOSIS: NORMAL
EKG P AXIS: 58 DEGREES
EKG P-R INTERVAL: 166 MS
EKG Q-T INTERVAL: 388 MS
EKG QRS DURATION: 82 MS
EKG QTC CALCULATION (BAZETT): 415 MS
EKG R AXIS: 79 DEGREES
EKG T AXIS: 56 DEGREES
EKG VENTRICULAR RATE: 69 BPM

## 2025-05-09 ENCOUNTER — CLINICAL DOCUMENTATION (OUTPATIENT)
Facility: CLINIC | Age: 45
End: 2025-05-09

## 2025-05-09 ENCOUNTER — TELEPHONE (OUTPATIENT)
Facility: CLINIC | Age: 45
End: 2025-05-09

## 2025-05-09 NOTE — TELEPHONE ENCOUNTER
Care Transitions Initial Follow Up Call    Outreach made within 2 business days of discharge: Yes    Patient: Tacos Raymond Sr. Patient : 1980   MRN: [id number]  Reason for Facial numbnessAdmission: 25  Discharge Date: 25       Spoke with: Mr. Raymond    Discharge department/facility: [facility] Patient Contact:  Was patient able to fill all prescriptions: Yes  Was patient instructed to bring all medications to the follow-up visit: Yes  Is patient taking all medications as directed in the discharge summary? Yes  Does patient understand their discharge instructions: Yes  Does patient have questions or concerns that need addressed prior to 7-14 day follow up office visit: yes - Yes      Additional needs identified to be addressed with provider  I spoke to Mr. Raymond.  He agreed to make a hospital follow up for facial numbness.  He went to see his Neurologist today.  He asked to have us fax the MRI & CT, CTA   To her because she can't pull it up in her system  I agreed to fax them.             Scheduled appointment with PCP within 7-14 days    Follow Up  No future appointments.    Ellen Kumar MA

## 2025-05-09 NOTE — PROGRESS NOTES
Physician Progress Note      PATIENT:               JCARLOS POTTS  CSN #:                  [id number]  :                       1980  ADMIT DATE:       5/3/2025 4:36 AM  DISCH DATE:        2025 4:44 PM  RESPONDING  PROVIDER #:        Jr AROLDO Miguel MD          QUERY TEXT:    Based on your medical judgment, please clarify the circumstances of possible   Vitamin B6 toxicity documented in Internal-Medicine progress notes ( Dr Andrea Miguel)    The clinical indicators include:  - \"He has seen a neurologist recently who ordered a number of tests and was   found to have an elevated B6 level\" (Neurology-Consult  Dr Bulmaro Gavin)  - \"Possible vit B6 toxicity POA with sensory findings\" (IM-PN  Dr Andrea Miguel)  - \"Vit B6 level elevated at 38.4 - Taking extra supplements at home\" (IM-PN    Dr Andrea Miguel)  - \"He will discontinue his multivitamin and try to avoid to some extent   vitamin B6 rich foods\" (IM-PN  Dr Andrea Miguel)  - \"Possibly the neurologic symptoms are all related to too much B6\" (DS Dr Andrea Miguel)  Options provided:  -- Accidental overdose  -- Intentional overdose  -- Adverse effect, substance properly administered  -- Other - I will add my own diagnosis  -- Disagree - Not applicable / Not valid  -- Disagree - Clinically unable to determine / Unknown  -- Refer to Clinical Documentation Reviewer    PROVIDER RESPONSE TEXT:    This patient had an accidental overdose.    Query created by: Mars Boyer on 2025 2:44 PM      Electronically signed by:  Jr AROLDO Miguel MD 2025 3:24 PM